# Patient Record
Sex: FEMALE | Race: BLACK OR AFRICAN AMERICAN | NOT HISPANIC OR LATINO | Employment: STUDENT | URBAN - METROPOLITAN AREA
[De-identification: names, ages, dates, MRNs, and addresses within clinical notes are randomized per-mention and may not be internally consistent; named-entity substitution may affect disease eponyms.]

---

## 2023-03-16 LAB
APPEARANCE, URINE: CLEAR
BILIRUBIN, URINE: NEGATIVE
BLOOD, URINE: NEGATIVE
COLOR, URINE: YELLOW
GLUCOSE, URINE: NEGATIVE MG/DL
KETONES, URINE: NEGATIVE MG/DL
LEUKOCYTE ESTERASE, URINE: NEGATIVE
NITRITE, URINE: NEGATIVE
PH, URINE: 5 (ref 5–8)
PROTEIN, URINE: NEGATIVE MG/DL
SPECIFIC GRAVITY, URINE: 1.02 (ref 1–1.03)
UROBILINOGEN, URINE: <2 MG/DL (ref 0–1.9)

## 2023-03-17 LAB
CHLAMYDIA TRACH., AMPLIFIED: NEGATIVE
N. GONORRHEA, AMPLIFIED: NEGATIVE
TRICHOMONAS VAGINALIS: NEGATIVE

## 2023-04-13 LAB
HIV 1/ 2 AG/AB SCREEN: NONREACTIVE
SYPHILIS TOTAL AB: NONREACTIVE

## 2023-04-14 LAB
CHLAMYDIA TRACH., AMPLIFIED: NEGATIVE
N. GONORRHEA, AMPLIFIED: POSITIVE
TRICHOMONAS VAGINALIS: NEGATIVE

## 2023-06-02 DIAGNOSIS — F32.A DEPRESSION, UNSPECIFIED DEPRESSION TYPE: Primary | ICD-10-CM

## 2023-06-02 LAB — HCG, URINE: NEGATIVE

## 2023-06-02 RX ORDER — ESCITALOPRAM OXALATE 20 MG/1
20 TABLET ORAL DAILY
Qty: 30 TABLET | Refills: 3 | Status: SHIPPED | OUTPATIENT
Start: 2023-06-02 | End: 2024-01-25 | Stop reason: WASHOUT

## 2023-06-03 LAB
CHLAMYDIA TRACH., AMPLIFIED: NEGATIVE
CLUE CELLS: PRESENT
N. GONORRHEA, AMPLIFIED: NEGATIVE
NUGENT SCORE: 8
TRICHOMONAS VAGINALIS: NEGATIVE
YEAST: ABNORMAL

## 2023-06-17 DIAGNOSIS — N93.9 ABNORMAL UTERINE BLEEDING: Primary | ICD-10-CM

## 2023-06-17 RX ORDER — ESTRADIOL 2 MG/1
2 TABLET ORAL DAILY
Qty: 7 TABLET | Refills: 0 | Status: SHIPPED | OUTPATIENT
Start: 2023-06-17 | End: 2023-10-03 | Stop reason: ALTCHOICE

## 2023-10-03 ENCOUNTER — OFFICE VISIT (OUTPATIENT)
Dept: PEDIATRICS | Facility: CLINIC | Age: 18
End: 2023-10-03
Payer: COMMERCIAL

## 2023-10-03 VITALS
SYSTOLIC BLOOD PRESSURE: 101 MMHG | TEMPERATURE: 98.4 F | BODY MASS INDEX: 20 KG/M2 | HEART RATE: 85 BPM | HEIGHT: 65 IN | DIASTOLIC BLOOD PRESSURE: 67 MMHG | RESPIRATION RATE: 18 BRPM | WEIGHT: 120.02 LBS

## 2023-10-03 DIAGNOSIS — Z00.00 HEALTHCARE MAINTENANCE: ICD-10-CM

## 2023-10-03 DIAGNOSIS — Z11.3 SCREEN FOR STD (SEXUALLY TRANSMITTED DISEASE): Primary | ICD-10-CM

## 2023-10-03 DIAGNOSIS — Z34.00 PRIMIGRAVIDA, ANTEPARTUM (HHS-HCC): ICD-10-CM

## 2023-10-03 DIAGNOSIS — Z32.01 POSITIVE PREGNANCY TEST (HHS-HCC): ICD-10-CM

## 2023-10-03 LAB — PREGNANCY TEST URINE, POC: POSITIVE

## 2023-10-03 PROCEDURE — 99214 OFFICE O/P EST MOD 30 MIN: CPT | Performed by: PEDIATRICS

## 2023-10-03 PROCEDURE — 81025 URINE PREGNANCY TEST: CPT | Performed by: PEDIATRICS

## 2023-10-03 PROCEDURE — 3008F BODY MASS INDEX DOCD: CPT | Performed by: PEDIATRICS

## 2023-10-03 PROCEDURE — 99214 OFFICE O/P EST MOD 30 MIN: CPT | Mod: GC | Performed by: PEDIATRICS

## 2023-10-03 RX ORDER — TRAZODONE HYDROCHLORIDE 100 MG/1
100 TABLET ORAL NIGHTLY
COMMUNITY
End: 2023-10-05

## 2023-10-03 RX ORDER — VIT C/E/ZN/COPPR/LUTEIN/ZEAXAN 250MG-90MG
25 CAPSULE ORAL DAILY
COMMUNITY
End: 2023-10-05 | Stop reason: ALTCHOICE

## 2023-10-03 ASSESSMENT — ENCOUNTER SYMPTOMS
FEVER: 0
HEMATURIA: 0
HEADACHES: 0
DYSURIA: 0
EYE PAIN: 0
FATIGUE: 1
COUGH: 0
CONSTIPATION: 0
SORE THROAT: 0
DIARRHEA: 0
CHEST TIGHTNESS: 0
BACK PAIN: 0
ABDOMINAL PAIN: 1
FREQUENCY: 0
EYE DISCHARGE: 0
VOMITING: 1
UNEXPECTED WEIGHT CHANGE: 0
DIFFICULTY URINATING: 0
SHORTNESS OF BREATH: 0
NAUSEA: 1
APPETITE CHANGE: 1
RHINORRHEA: 0
ARTHRALGIAS: 0

## 2023-10-03 NOTE — PROGRESS NOTES
Levi is a 17 year old presenting for follow up. Her last depot administration was supposed to be in July but she missed it. She doesn't know when her last menstrual cycle was, but she bled all of June, a little in July, and for 2 days in August. She is concerned that she is pregnant. She started being nauseous and vomiting in August. She took a home pregnancy test a few days ago which was positive after having a negative one a month prior. She was last sexually active 2 weeks ago with the same person since the end of June. She is also having cramps in the suprapubic area.    Depression: no SI/HI.    Review of Systems   Constitutional:  Positive for appetite change (increased) and fatigue. Negative for fever and unexpected weight change.   HENT:  Negative for congestion, rhinorrhea and sore throat.    Eyes:  Negative for pain, discharge and visual disturbance.   Respiratory:  Negative for cough, chest tightness and shortness of breath.    Cardiovascular:  Negative for chest pain.   Gastrointestinal:  Positive for abdominal pain, nausea and vomiting. Negative for constipation and diarrhea.   Genitourinary:  Positive for pelvic pain. Negative for difficulty urinating, dyspareunia, dysuria, frequency, hematuria, vaginal discharge and vaginal pain.   Musculoskeletal:  Negative for arthralgias and back pain.   Skin:  Negative for rash.   Neurological:  Negative for headaches.      She takes an anxiety medication PRN, trazodone 100 mg, lexapro 20 mg, and vitamin D    GENERAL: Well appearing, in no acute distress.  HEENT: No conjunctival injection, no scleral icterus, no conjunctival purulence or exudate. EOMI.  NECK: Supple, full voluntary range of motion  CHEST: Normal, age appropriate external chest  RESPIRATORY: Lungs clear to auscultation bilaterally. No wheezing, no crackles, no coarse breath sounds. Normal work of breathing, age-appropriate respiratory rate.  CARDIOVASCULAR: Regular, age-appropriate rate and  rhythm. No extra heart sounds, no murmurs. Cap refill <2 seconds.  ABDOMEN: Soft, non-distended. Tenderness to palpation in suprapubic area. Bimanual exam consistent with pregnancy about 6 weeks along  GENITOURINARY: Normal external genitalia.  MUSCULOSKELETAL: Normal muscle bulk in all extremities. Normal voluntary range of motion. No joint or limb tenderness.  SKIN: No pathological rashes seen. Well perfused.  NEURO: Sensation and motor capacities grossly intact. Alert and awake with no altered level of consciousness.  PSYCH: General affect within normal limits.     Assessment/Plan:  Levi is a 17 year old presenting for a follow up visit today. UPT was obtained which showed that she is pregnant. She would like to keep the baby and was advised to make an appt with OBGYN. She requested GC/Chlamydia testing but is having no symptoms. Bimanual exam was not concerning for PID in regards to her suprapubic tenderness. She characterizes it as more of pressure. Plan below:    1. Screen for STD (sexually transmitted disease)  - Deferred HIV and syphilis testing until OBGYN appt  - C. trachomatis + N. gonorrhoeae, Amplified; Future    2. Primigravida, antepartum  - Positive pregnancy test on 10/3 in office  - prenatal vitamin calcium-iron-folic tablet 1 tablet   - Advised her to make an appt with OBGYN  - Counseled on discontinuing all drug/tobacco/alcohol use    I saw and evaluated the patient. I personally obtained the key and critical portions of the history and physical exam or was physically present for key and critical portions performed by the resident/fellow. I reviewed the resident/fellow's documentation and discussed the patient with the resident/fellow. I agree with the resident/fellow's medical decision making as documented in the note.    Brit Moses MD

## 2023-10-04 PROBLEM — R79.89 ELEVATED LFTS: Status: ACTIVE | Noted: 2023-10-04

## 2023-10-04 PROBLEM — N93.9 ABNORMAL UTERINE BLEEDING: Status: ACTIVE | Noted: 2023-10-04

## 2023-10-04 PROBLEM — R06.09 DOE (DYSPNEA ON EXERTION): Status: ACTIVE | Noted: 2023-10-04

## 2023-10-04 PROBLEM — T39.1X2S: Status: ACTIVE | Noted: 2023-10-04

## 2023-10-04 PROBLEM — I47.29 NONSUSTAINED VENTRICULAR TACHYCARDIA (MULTI): Status: ACTIVE | Noted: 2023-10-04

## 2023-10-04 PROBLEM — E86.0 DEHYDRATION: Status: ACTIVE | Noted: 2023-10-04

## 2023-10-04 PROBLEM — F32.9 MAJOR DEPRESSIVE DISORDER: Status: ACTIVE | Noted: 2023-10-04

## 2023-10-04 PROBLEM — N89.8 VAGINAL ODOR: Status: ACTIVE | Noted: 2023-10-04

## 2023-10-04 PROBLEM — R82.90 ABNORMAL URINE FINDINGS: Status: ACTIVE | Noted: 2023-10-04

## 2023-10-04 PROBLEM — N94.6 ADOLESCENT DYSMENORRHEA: Status: ACTIVE | Noted: 2023-10-04

## 2023-10-04 PROBLEM — F32.A DEPRESSION: Status: ACTIVE | Noted: 2023-10-04

## 2023-10-04 PROBLEM — A54.9 GONORRHEA: Status: ACTIVE | Noted: 2023-10-04

## 2023-10-04 PROBLEM — T14.91XA SUICIDE ATTEMPT (MULTI): Status: ACTIVE | Noted: 2023-10-04

## 2023-10-04 PROBLEM — N76.0 BACTERIAL VAGINOSIS: Status: ACTIVE | Noted: 2023-10-04

## 2023-10-04 PROBLEM — T39.1X1A TYLENOL INGESTION: Status: ACTIVE | Noted: 2023-10-04

## 2023-10-04 PROBLEM — R35.0 URINE FREQUENCY: Status: ACTIVE | Noted: 2023-10-04

## 2023-10-04 PROBLEM — B96.89 BACTERIAL VAGINOSIS: Status: ACTIVE | Noted: 2023-10-04

## 2023-10-04 PROBLEM — B37.31 VULVOVAGINITIS CANDIDA ALBICANS: Status: ACTIVE | Noted: 2023-10-04

## 2023-10-04 PROBLEM — F41.9 ANXIETY: Status: ACTIVE | Noted: 2023-10-04

## 2023-10-04 RX ORDER — HYDROXYZINE PAMOATE 25 MG/1
1-2 CAPSULE ORAL DAILY PRN
COMMUNITY
Start: 2023-08-29 | End: 2023-10-05

## 2023-10-04 RX ORDER — ASPIRIN 325 MG
1 TABLET ORAL DAILY
COMMUNITY
Start: 2023-04-13 | End: 2023-10-05 | Stop reason: ALTCHOICE

## 2023-10-04 RX ORDER — MEDROXYPROGESTERONE ACETATE 150 MG/ML
INJECTION, SUSPENSION INTRAMUSCULAR
COMMUNITY
Start: 2022-10-11 | End: 2023-10-05

## 2023-10-04 RX ORDER — BUPROPION HYDROCHLORIDE 150 MG/1
1 TABLET ORAL EVERY MORNING
COMMUNITY
Start: 2022-04-28 | End: 2023-10-05 | Stop reason: ALTCHOICE

## 2023-10-04 RX ORDER — NYSTATIN 100000 [USP'U]/G
POWDER TOPICAL
COMMUNITY
Start: 2023-04-17 | End: 2023-10-05 | Stop reason: ALTCHOICE

## 2023-10-04 RX ORDER — DOXYLAMINE SUCCINATE 25 MG
TABLET ORAL 2 TIMES DAILY
COMMUNITY
Start: 2023-04-14 | End: 2023-10-05 | Stop reason: ALTCHOICE

## 2023-10-05 ENCOUNTER — LAB (OUTPATIENT)
Dept: LAB | Facility: LAB | Age: 18
End: 2023-10-05
Payer: COMMERCIAL

## 2023-10-05 ENCOUNTER — INITIAL PRENATAL (OUTPATIENT)
Dept: OBSTETRICS AND GYNECOLOGY | Facility: CLINIC | Age: 18
End: 2023-10-05
Payer: COMMERCIAL

## 2023-10-05 VITALS — SYSTOLIC BLOOD PRESSURE: 104 MMHG | WEIGHT: 120.8 LBS | DIASTOLIC BLOOD PRESSURE: 74 MMHG | BODY MASS INDEX: 20.37 KG/M2

## 2023-10-05 DIAGNOSIS — F33.9 EPISODE OF RECURRENT MAJOR DEPRESSIVE DISORDER, UNSPECIFIED DEPRESSION EPISODE SEVERITY (CMS-HCC): ICD-10-CM

## 2023-10-05 DIAGNOSIS — Z34.01 SUPERVISION OF NORMAL FIRST TEEN PREGNANCY IN FIRST TRIMESTER (HHS-HCC): ICD-10-CM

## 2023-10-05 DIAGNOSIS — O36.80X0 PREGNANCY, LOCATION UNKNOWN (HHS-HCC): ICD-10-CM

## 2023-10-05 DIAGNOSIS — Z3A.08 8 WEEKS GESTATION OF PREGNANCY (HHS-HCC): Primary | ICD-10-CM

## 2023-10-05 DIAGNOSIS — I47.29 NONSUSTAINED VENTRICULAR TACHYCARDIA (MULTI): ICD-10-CM

## 2023-10-05 DIAGNOSIS — Z3A.08 8 WEEKS GESTATION OF PREGNANCY (HHS-HCC): ICD-10-CM

## 2023-10-05 PROBLEM — N76.0 BACTERIAL VAGINOSIS: Status: RESOLVED | Noted: 2023-10-04 | Resolved: 2023-10-05

## 2023-10-05 PROBLEM — N89.8 VAGINAL ODOR: Status: RESOLVED | Noted: 2023-10-04 | Resolved: 2023-10-05

## 2023-10-05 PROBLEM — N94.6 ADOLESCENT DYSMENORRHEA: Status: RESOLVED | Noted: 2023-10-04 | Resolved: 2023-10-05

## 2023-10-05 PROBLEM — E86.0 DEHYDRATION: Status: RESOLVED | Noted: 2023-10-04 | Resolved: 2023-10-05

## 2023-10-05 PROBLEM — B37.31 VULVOVAGINITIS CANDIDA ALBICANS: Status: RESOLVED | Noted: 2023-10-04 | Resolved: 2023-10-05

## 2023-10-05 PROBLEM — R82.90 ABNORMAL URINE FINDINGS: Status: RESOLVED | Noted: 2023-10-04 | Resolved: 2023-10-05

## 2023-10-05 PROBLEM — B96.89 BACTERIAL VAGINOSIS: Status: RESOLVED | Noted: 2023-10-04 | Resolved: 2023-10-05

## 2023-10-05 LAB
ABO GROUP (TYPE) IN BLOOD: NORMAL
ERYTHROCYTE [DISTWIDTH] IN BLOOD BY AUTOMATED COUNT: 14.4 % (ref 11.5–14.5)
HCT VFR BLD AUTO: 41.5 % (ref 36–46)
HGB BLD-MCNC: 13.5 G/DL (ref 12–16)
HIV 1+2 AB+HIV1 P24 AG SERPL QL IA: NONREACTIVE
MCH RBC QN AUTO: 30.4 PG (ref 26–34)
MCHC RBC AUTO-ENTMCNC: 32.5 G/DL (ref 32–36)
MCV RBC AUTO: 94 FL (ref 80–100)
NRBC BLD-RTO: 0 /100 WBCS (ref 0–0)
PLATELET # BLD AUTO: 296 X10*3/UL (ref 150–450)
PMV BLD AUTO: 10.7 FL (ref 7.5–11.5)
PREGNANCY TEST URINE, POC: POSITIVE
RBC # BLD AUTO: 4.44 X10*6/UL (ref 4–5.2)
REFLEX ADDED, ANEMIA PANEL: NORMAL
RH FACTOR (ANTIGEN D): NORMAL
WBC # BLD AUTO: 5.7 X10*3/UL (ref 4.4–11.3)

## 2023-10-05 PROCEDURE — 81025 URINE PREGNANCY TEST: CPT

## 2023-10-05 PROCEDURE — 87340 HEPATITIS B SURFACE AG IA: CPT

## 2023-10-05 PROCEDURE — 81243 FMR1 GEN ALY DETC ABNL ALLEL: CPT

## 2023-10-05 PROCEDURE — 99214 OFFICE O/P EST MOD 30 MIN: CPT | Mod: GC | Performed by: OBSTETRICS & GYNECOLOGY

## 2023-10-05 PROCEDURE — 99204 OFFICE O/P NEW MOD 45 MIN: CPT | Performed by: OBSTETRICS & GYNECOLOGY

## 2023-10-05 PROCEDURE — 87086 URINE CULTURE/COLONY COUNT: CPT

## 2023-10-05 PROCEDURE — 86900 BLOOD TYPING SEROLOGIC ABO: CPT

## 2023-10-05 PROCEDURE — 83021 HEMOGLOBIN CHROMOTOGRAPHY: CPT

## 2023-10-05 PROCEDURE — 81329 SMN1 GENE DOS/DELETION ALYS: CPT

## 2023-10-05 PROCEDURE — 87389 HIV-1 AG W/HIV-1&-2 AB AG IA: CPT

## 2023-10-05 PROCEDURE — 85027 COMPLETE CBC AUTOMATED: CPT

## 2023-10-05 PROCEDURE — 83020 HEMOGLOBIN ELECTROPHORESIS: CPT | Performed by: OBSTETRICS & GYNECOLOGY

## 2023-10-05 PROCEDURE — 86901 BLOOD TYPING SEROLOGIC RH(D): CPT

## 2023-10-05 PROCEDURE — 36415 COLL VENOUS BLD VENIPUNCTURE: CPT

## 2023-10-05 PROCEDURE — 86780 TREPONEMA PALLIDUM: CPT

## 2023-10-05 PROCEDURE — 84702 CHORIONIC GONADOTROPIN TEST: CPT

## 2023-10-05 PROCEDURE — 81220 CFTR GENE COM VARIANTS: CPT

## 2023-10-05 PROCEDURE — 86803 HEPATITIS C AB TEST: CPT

## 2023-10-05 PROCEDURE — 86317 IMMUNOASSAY INFECTIOUS AGENT: CPT

## 2023-10-05 PROCEDURE — 87800 DETECT AGNT MULT DNA DIREC: CPT

## 2023-10-05 ASSESSMENT — EDINBURGH POSTNATAL DEPRESSION SCALE (EPDS)
I HAVE BEEN SO UNHAPPY THAT I HAVE HAD DIFFICULTY SLEEPING: NOT VERY OFTEN
I HAVE BLAMED MYSELF UNNECESSARILY WHEN THINGS WENT WRONG: YES, SOME OF THE TIME
I HAVE BEEN ABLE TO LAUGH AND SEE THE FUNNY SIDE OF THINGS: AS MUCH AS I ALWAYS COULD
I HAVE FELT SAD OR MISERABLE: YES, MOST OF THE TIME
I HAVE LOOKED FORWARD WITH ENJOYMENT TO THINGS: AS MUCH AS I EVER DID
I HAVE BEEN ANXIOUS OR WORRIED FOR NO GOOD REASON: YES, SOMETIMES
THINGS HAVE BEEN GETTING ON TOP OF ME: YES, SOMETIMES I HAVEN'T BEEN COPING AS WELL AS USUAL
I HAVE FELT SCARED OR PANICKY FOR NO GOOD REASON: NO, NOT AT ALL
I HAVE BEEN SO UNHAPPY THAT I HAVE BEEN CRYING: YES, MOST OF THE TIME
TOTAL SCORE: 13
THE THOUGHT OF HARMING MYSELF HAS OCCURRED TO ME: NEVER

## 2023-10-05 NOTE — ASSESSMENT & PLAN NOTE
- PNLs ordered  - for dating US / verification of IUP  - interested in carrier screening, ordered  - interested in genetic screening, for First Check   - continue PNV   - discussed Centering Pregnancy, pt uncertain but will consider

## 2023-10-05 NOTE — PROGRESS NOTES
Subjective   Patient ID [id number]   Levi Newberry is a 17 y.o.  at 5w1d with a working estimated date of delivery of 2024, by Last Menstrual Period who presents for an initial prenatal visit. This pregnancy is unplanned, pt uncertain but thinks she wants to keep the pregnancy. Uncertain of LMP b/c transitioning off depo. Support from aunt/uncle, mom. FOB supportive.     Pregnancy c/b  - h/o arrhythmia - non-sustained VT identified during hospitalization for tylenol overdose, s/p neg Holter monitor workup, neg echo, neg cardiac MRI; cardiac stress test pending   - syncopal episodes since , 3 episodes total - workup as above    - h/o depression/anxiety - taking lexapro 20mg, follows with psych and counseling. H/o suicide attempts via  overdose, last in 2022. Reports mood is stable/improved, denies SI/HI currently    GynHx: remote h/o gonorrhea, chlamydia s/p treatment   PMH: as above  PSH: none  Meds: lexapro, PNV  All: NKDA  Soc: denies t/e/d  Imm: COVID x1    OB History    Para Term  AB Living   1             SAB IAB Ectopic Multiple Live Births                  # Outcome Date GA Lbr Jim/2nd Weight Sex Delivery Anes PTL Lv   1 Current                   Objective     General: no acute distress  HEENT: normocephalic, atraumatic. Thyroid nonenlarged  Heart: warm and well perfused  Lungs: clear to auscultation bilaterally  Abdomen: soft, nontender throughout  : normal vagina, cervix, uterus measuring ~6wks  Extremities: moving all extremities  Neuro: awake and conversant  Psych: appropriate mood and affect     Expected Total Weight Gain: Could not be calculated   Pregravid BMI: Could not be calculated       Assessment/Plan   Problem List Items Addressed This Visit             ICD-10-CM    Major depressive disorder F32.9     - mood currently stable, denies SI/HI  - continue lexapro 20mg          Nonsustained ventricular tachycardia (CMS/HCC) I47.29     - Recommend re-establish  with cardiology to evaluate if further workup needed         Pregnancy, location unknown O36.80X0     - suspect likely early IUP  - hcg quant ordered   - no US availability today, schedule for next available  - ectopic precautions given          8 weeks gestation of pregnancy - Primary Z3A.08     - PNLs ordered  - for dating US / verification of IUP  - interested in carrier screening, ordered  - interested in genetic screening, for First Check   - continue PNV   - discussed Centering Pregnancy, pt uncertain but will consider          Relevant Orders    POC pregnancy, urine (Completed)    US MAC OB imaging order    ABO/Rh    CBC Anemia Panel With Reflex,Pregnancy    HEMOGLOBIN IDENTIFICATION WITH PATH REVIEW    Hepatitis B surface antigen    Hepatitis C antibody    Rubella Antibody, IgG    Syphilis Screen with Reflex    HIV 1/2 Antigen/Antibody Screen with Reflex to Confirmation    Urine Culture    CF carrier screen    SMA Carrier Screening    Fragile X Analysis    hCG, quantitative    Comprehensive metabolic panel     Other Visit Diagnoses         Codes    Supervision of normal first teen pregnancy in first trimester     Z34.01            Follow up in 4 weeks for return OB visit.    Seen and d/w Dr. Zia Sloan MD  PGY2, Obstetrics and Gynecology

## 2023-10-05 NOTE — PROGRESS NOTES
I saw and evaluated the patient. I personally obtained the key and critical portions of the history and physical exam or was physically present for key and critical portions performed by the resident/fellow. I reviewed the resident/fellow's documentation and discussed the patient with the resident/fellow. I agree with the resident/fellow's medical decision making as documented in the note.    Marlin Lizarraga MD

## 2023-10-05 NOTE — ASSESSMENT & PLAN NOTE
- suspect likely early IUP  - hcg quant ordered   - no US availability today, schedule for next available  - ectopic precautions given

## 2023-10-06 LAB
B-HCG SERPL-ACNC: 1727 MIU/ML
BACTERIA UR CULT: NORMAL
C TRACH RRNA SPEC QL NAA+PROBE: NEGATIVE
HBV SURFACE AG SERPL QL IA: NONREACTIVE
HCV AB SER QL: NONREACTIVE
HEMOGLOBIN A2: 2.9 % (ref 2–3.5)
HEMOGLOBIN A: 96.8 % (ref 95.8–98)
HEMOGLOBIN C: 0 %
HEMOGLOBIN F: 0.3 % (ref 0–2)
HEMOGLOBIN IDENTIFICATION INTERPRETATION: NORMAL
HEMOGLOBIN OTHER: 0 %
HEMOGLOBIN S: 0 %
N GONORRHOEA DNA SPEC QL PROBE+SIG AMP: NEGATIVE
PATH REVIEW-HGB IDENTIFICATION: NORMAL
RUBV IGG SERPL IA-ACNC: 1.6 IA
RUBV IGG SERPL QL IA: POSITIVE
T PALLIDUM AB SER QL: NONREACTIVE

## 2023-10-06 NOTE — RESULT ENCOUNTER NOTE
She has an ultrasound today 10/6 at 1130, I'll add her to the beta book so we can follow up a quant as well depending what they see on TVUS

## 2023-10-10 ENCOUNTER — ANCILLARY PROCEDURE (OUTPATIENT)
Dept: OBSTETRICS AND GYNECOLOGY | Facility: CLINIC | Age: 18
End: 2023-10-10
Payer: COMMERCIAL

## 2023-10-10 ENCOUNTER — ANCILLARY PROCEDURE (OUTPATIENT)
Dept: RADIOLOGY | Facility: CLINIC | Age: 18
End: 2023-10-10
Payer: COMMERCIAL

## 2023-10-10 DIAGNOSIS — O36.80X1: ICD-10-CM

## 2023-10-10 DIAGNOSIS — Z03.74 ENCOUNTER FOR SUSPECTED PROBLEM WITH FETAL GROWTH RULED OUT: ICD-10-CM

## 2023-10-10 DIAGNOSIS — Z36.86 ENCOUNTER FOR ANTENATAL SCREENING FOR CERVICAL LENGTH (HHS-HCC): ICD-10-CM

## 2023-10-10 LAB
ELECTRONICALLY SIGNED BY: NORMAL
FRAGILE X INTERPRETATION: NORMAL
FRAGILE X RESULT: NORMAL

## 2023-10-10 PROCEDURE — 76817 TRANSVAGINAL US OBSTETRIC: CPT | Performed by: STUDENT IN AN ORGANIZED HEALTH CARE EDUCATION/TRAINING PROGRAM

## 2023-10-10 PROCEDURE — 76801 OB US < 14 WKS SINGLE FETUS: CPT

## 2023-10-10 PROCEDURE — 76817 TRANSVAGINAL US OBSTETRIC: CPT

## 2023-10-10 PROCEDURE — 76801 OB US < 14 WKS SINGLE FETUS: CPT | Performed by: STUDENT IN AN ORGANIZED HEALTH CARE EDUCATION/TRAINING PROGRAM

## 2023-10-11 ENCOUNTER — APPOINTMENT (OUTPATIENT)
Dept: OBSTETRICS AND GYNECOLOGY | Facility: HOSPITAL | Age: 18
End: 2023-10-11
Payer: COMMERCIAL

## 2023-10-11 PROBLEM — O36.80X0 PREGNANCY, LOCATION UNKNOWN (HHS-HCC): Status: RESOLVED | Noted: 2023-10-05 | Resolved: 2023-10-11

## 2023-10-13 LAB
CFTR MUT ANL BLD/T: NORMAL
ELECTRONICALLY SIGNED BY: NORMAL
ELECTRONICALLY SIGNED BY: NORMAL
SMA RESULT: NORMAL

## 2023-10-20 ENCOUNTER — ANCILLARY PROCEDURE (OUTPATIENT)
Dept: RADIOLOGY | Facility: CLINIC | Age: 18
End: 2023-10-20
Payer: COMMERCIAL

## 2023-10-20 DIAGNOSIS — Z36.87 ENCOUNTER FOR ANTENATAL SCREENING FOR UNCERTAIN DATES (HHS-HCC): ICD-10-CM

## 2023-10-20 DIAGNOSIS — Z3A.08 8 WEEKS GESTATION OF PREGNANCY (HHS-HCC): Primary | ICD-10-CM

## 2023-10-20 PROCEDURE — 76816 OB US FOLLOW-UP PER FETUS: CPT | Performed by: OBSTETRICS & GYNECOLOGY

## 2023-10-20 PROCEDURE — 76817 TRANSVAGINAL US OBSTETRIC: CPT | Performed by: OBSTETRICS & GYNECOLOGY

## 2023-10-20 PROCEDURE — 76817 TRANSVAGINAL US OBSTETRIC: CPT

## 2023-10-20 PROCEDURE — 76816 OB US FOLLOW-UP PER FETUS: CPT

## 2023-10-22 ENCOUNTER — APPOINTMENT (OUTPATIENT)
Dept: RADIOLOGY | Facility: HOSPITAL | Age: 18
End: 2023-10-22
Payer: COMMERCIAL

## 2023-10-22 ENCOUNTER — HOSPITAL ENCOUNTER (EMERGENCY)
Facility: HOSPITAL | Age: 18
Discharge: HOME | End: 2023-10-22
Attending: EMERGENCY MEDICINE
Payer: COMMERCIAL

## 2023-10-22 VITALS
DIASTOLIC BLOOD PRESSURE: 77 MMHG | HEART RATE: 77 BPM | SYSTOLIC BLOOD PRESSURE: 103 MMHG | WEIGHT: 118 LBS | OXYGEN SATURATION: 99 % | BODY MASS INDEX: 20.14 KG/M2 | HEIGHT: 64 IN | TEMPERATURE: 98.6 F | RESPIRATION RATE: 16 BRPM

## 2023-10-22 LAB
ALBUMIN SERPL BCP-MCNC: 4.3 G/DL (ref 3.4–5)
ALP SERPL-CCNC: 40 U/L (ref 33–110)
ALT SERPL W P-5'-P-CCNC: 7 U/L (ref 7–45)
AMPHETAMINES UR QL SCN: ABNORMAL
ANION GAP SERPL CALC-SCNC: 14 MMOL/L (ref 10–20)
APAP SERPL-MCNC: <10 UG/ML
APPEARANCE UR: CLEAR
AST SERPL W P-5'-P-CCNC: 14 U/L (ref 9–39)
BARBITURATES UR QL SCN: ABNORMAL
BASOPHILS # BLD AUTO: 0.02 X10*3/UL (ref 0–0.1)
BASOPHILS NFR BLD AUTO: 0.2 %
BENZODIAZ UR QL SCN: ABNORMAL
BILIRUB SERPL-MCNC: 0.8 MG/DL (ref 0–1.2)
BILIRUB UR STRIP.AUTO-MCNC: NEGATIVE MG/DL
BUN SERPL-MCNC: 8 MG/DL (ref 6–23)
BZE UR QL SCN: ABNORMAL
C TRACH RRNA SPEC QL NAA+PROBE: NEGATIVE
CALCIUM SERPL-MCNC: 9.4 MG/DL (ref 8.6–10.6)
CANNABINOIDS UR QL SCN: ABNORMAL
CARDIAC TROPONIN I PNL SERPL HS: <3 NG/L (ref 0–34)
CHLORIDE SERPL-SCNC: 103 MMOL/L (ref 98–107)
CLUE CELLS SPEC QL WET PREP: NORMAL
CO2 SERPL-SCNC: 23 MMOL/L (ref 21–32)
COLOR UR: YELLOW
CREAT SERPL-MCNC: 0.74 MG/DL (ref 0.5–1.05)
EOSINOPHIL # BLD AUTO: 0.03 X10*3/UL (ref 0–0.7)
EOSINOPHIL NFR BLD AUTO: 0.3 %
ERYTHROCYTE [DISTWIDTH] IN BLOOD BY AUTOMATED COUNT: 13.4 % (ref 11.5–14.5)
ETHANOL SERPL-MCNC: <10 MG/DL
FENTANYL+NORFENTANYL UR QL SCN: ABNORMAL
GFR SERPL CREATININE-BSD FRML MDRD: >90 ML/MIN/1.73M*2
GLUCOSE SERPL-MCNC: 100 MG/DL (ref 74–99)
GLUCOSE UR STRIP.AUTO-MCNC: NEGATIVE MG/DL
HCT VFR BLD AUTO: 35.4 % (ref 36–46)
HGB BLD-MCNC: 12.2 G/DL (ref 12–16)
HIV 1+2 AB+HIV1 P24 AG SERPL QL IA: NONREACTIVE
HOLD SPECIMEN: NORMAL
IMM GRANULOCYTES # BLD AUTO: 0.03 X10*3/UL (ref 0–0.7)
IMM GRANULOCYTES NFR BLD AUTO: 0.3 % (ref 0–0.9)
KETONES UR STRIP.AUTO-MCNC: ABNORMAL MG/DL
LEUKOCYTE ESTERASE UR QL STRIP.AUTO: ABNORMAL
LYMPHOCYTES # BLD AUTO: 2.07 X10*3/UL (ref 1.2–4.8)
LYMPHOCYTES NFR BLD AUTO: 22.9 %
MCH RBC QN AUTO: 30.4 PG (ref 26–34)
MCHC RBC AUTO-ENTMCNC: 34.5 G/DL (ref 32–36)
MCV RBC AUTO: 88 FL (ref 80–100)
MONOCYTES # BLD AUTO: 0.63 X10*3/UL (ref 0.1–1)
MONOCYTES NFR BLD AUTO: 7 %
MUCOUS THREADS #/AREA URNS AUTO: NORMAL /LPF
N GONORRHOEA DNA SPEC QL PROBE+SIG AMP: NEGATIVE
NEUTROPHILS # BLD AUTO: 6.27 X10*3/UL (ref 1.2–7.7)
NEUTROPHILS NFR BLD AUTO: 69.3 %
NITRITE UR QL STRIP.AUTO: NEGATIVE
NRBC BLD-RTO: 0 /100 WBCS (ref 0–0)
OPIATES UR QL SCN: ABNORMAL
OXYCODONE+OXYMORPHONE UR QL SCN: ABNORMAL
PCP UR QL SCN: ABNORMAL
PH UR STRIP.AUTO: 5 [PH]
PLATELET # BLD AUTO: 258 X10*3/UL (ref 150–450)
PMV BLD AUTO: 9.9 FL (ref 7.5–11.5)
POTASSIUM SERPL-SCNC: 3.5 MMOL/L (ref 3.5–5.3)
PROT SERPL-MCNC: 7 G/DL (ref 6.4–8.2)
PROT UR STRIP.AUTO-MCNC: ABNORMAL MG/DL
RBC # BLD AUTO: 4.01 X10*6/UL (ref 4–5.2)
RBC # UR STRIP.AUTO: NEGATIVE /UL
RBC #/AREA URNS AUTO: NORMAL /HPF
SALICYLATES SERPL-MCNC: <3 MG/DL
SARS-COV-2 RNA RESP QL NAA+PROBE: NOT DETECTED
SODIUM SERPL-SCNC: 136 MMOL/L (ref 136–145)
SP GR UR STRIP.AUTO: 1.03
SQUAMOUS #/AREA URNS AUTO: NORMAL /HPF
T VAGINALIS SPEC QL WET PREP: NORMAL
UROBILINOGEN UR STRIP.AUTO-MCNC: <2 MG/DL
WBC # BLD AUTO: 9.1 X10*3/UL (ref 4.4–11.3)
WBC #/AREA URNS AUTO: NORMAL /HPF
WBC VAG QL WET PREP: NORMAL
YEAST VAG QL WET PREP: NORMAL

## 2023-10-22 PROCEDURE — 2500000001 HC RX 250 WO HCPCS SELF ADMINISTERED DRUGS (ALT 637 FOR MEDICARE OP): Mod: SE | Performed by: STUDENT IN AN ORGANIZED HEALTH CARE EDUCATION/TRAINING PROGRAM

## 2023-10-22 PROCEDURE — 99283 EMERGENCY DEPT VISIT LOW MDM: CPT

## 2023-10-22 PROCEDURE — 80053 COMPREHEN METABOLIC PANEL: CPT | Performed by: EMERGENCY MEDICINE

## 2023-10-22 PROCEDURE — S0119 ONDANSETRON 4 MG: HCPCS | Mod: SE | Performed by: STUDENT IN AN ORGANIZED HEALTH CARE EDUCATION/TRAINING PROGRAM

## 2023-10-22 PROCEDURE — 80307 DRUG TEST PRSMV CHEM ANLYZR: CPT | Mod: CMCLAB | Performed by: EMERGENCY MEDICINE

## 2023-10-22 PROCEDURE — 80329 ANALGESICS NON-OPIOID 1 OR 2: CPT | Mod: 91 | Performed by: EMERGENCY MEDICINE

## 2023-10-22 PROCEDURE — 87086 URINE CULTURE/COLONY COUNT: CPT | Mod: CMCLAB | Performed by: STUDENT IN AN ORGANIZED HEALTH CARE EDUCATION/TRAINING PROGRAM

## 2023-10-22 PROCEDURE — 87800 DETECT AGNT MULT DNA DIREC: CPT | Mod: CMCLAB | Performed by: STUDENT IN AN ORGANIZED HEALTH CARE EDUCATION/TRAINING PROGRAM

## 2023-10-22 PROCEDURE — 93005 ELECTROCARDIOGRAM TRACING: CPT

## 2023-10-22 PROCEDURE — 81001 URINALYSIS AUTO W/SCOPE: CPT | Mod: CMCLAB | Performed by: STUDENT IN AN ORGANIZED HEALTH CARE EDUCATION/TRAINING PROGRAM

## 2023-10-22 PROCEDURE — 2500000004 HC RX 250 GENERAL PHARMACY W/ HCPCS (ALT 636 FOR OP/ED): Mod: SE | Performed by: STUDENT IN AN ORGANIZED HEALTH CARE EDUCATION/TRAINING PROGRAM

## 2023-10-22 PROCEDURE — 2500000005 HC RX 250 GENERAL PHARMACY W/O HCPCS: Mod: SE | Performed by: STUDENT IN AN ORGANIZED HEALTH CARE EDUCATION/TRAINING PROGRAM

## 2023-10-22 PROCEDURE — 99285 EMERGENCY DEPT VISIT HI MDM: CPT | Performed by: EMERGENCY MEDICINE

## 2023-10-22 PROCEDURE — 80320 DRUG SCREEN QUANTALCOHOLS: CPT | Mod: CMCLAB | Performed by: EMERGENCY MEDICINE

## 2023-10-22 PROCEDURE — 87389 HIV-1 AG W/HIV-1&-2 AB AG IA: CPT | Performed by: STUDENT IN AN ORGANIZED HEALTH CARE EDUCATION/TRAINING PROGRAM

## 2023-10-22 PROCEDURE — 84484 ASSAY OF TROPONIN QUANT: CPT | Mod: CMCLAB | Performed by: STUDENT IN AN ORGANIZED HEALTH CARE EDUCATION/TRAINING PROGRAM

## 2023-10-22 PROCEDURE — 87210 SMEAR WET MOUNT SALINE/INK: CPT | Mod: CMCLAB | Performed by: STUDENT IN AN ORGANIZED HEALTH CARE EDUCATION/TRAINING PROGRAM

## 2023-10-22 PROCEDURE — 71046 X-RAY EXAM CHEST 2 VIEWS: CPT | Performed by: STUDENT IN AN ORGANIZED HEALTH CARE EDUCATION/TRAINING PROGRAM

## 2023-10-22 PROCEDURE — 36415 COLL VENOUS BLD VENIPUNCTURE: CPT | Performed by: STUDENT IN AN ORGANIZED HEALTH CARE EDUCATION/TRAINING PROGRAM

## 2023-10-22 PROCEDURE — 87635 SARS-COV-2 COVID-19 AMP PRB: CPT | Mod: CMCLAB | Performed by: EMERGENCY MEDICINE

## 2023-10-22 PROCEDURE — 85025 COMPLETE CBC W/AUTO DIFF WBC: CPT | Performed by: EMERGENCY MEDICINE

## 2023-10-22 PROCEDURE — 99281 EMR DPT VST MAYX REQ PHY/QHP: CPT | Mod: 25

## 2023-10-22 PROCEDURE — 71046 X-RAY EXAM CHEST 2 VIEWS: CPT

## 2023-10-22 RX ORDER — DICYCLOMINE HYDROCHLORIDE 10 MG/1
10 CAPSULE ORAL ONCE
Status: COMPLETED | OUTPATIENT
Start: 2023-10-22 | End: 2023-10-22

## 2023-10-22 RX ORDER — ONDANSETRON 4 MG/1
4 TABLET, ORALLY DISINTEGRATING ORAL ONCE
Status: COMPLETED | OUTPATIENT
Start: 2023-10-22 | End: 2023-10-22

## 2023-10-22 RX ORDER — LIDOCAINE 560 MG/1
1 PATCH PERCUTANEOUS; TOPICAL; TRANSDERMAL DAILY
Status: DISCONTINUED | OUTPATIENT
Start: 2023-10-22 | End: 2023-10-22 | Stop reason: HOSPADM

## 2023-10-22 RX ORDER — DICYCLOMINE HYDROCHLORIDE 10 MG/ML
20 INJECTION INTRAMUSCULAR ONCE
Status: DISCONTINUED | OUTPATIENT
Start: 2023-10-22 | End: 2023-10-22

## 2023-10-22 RX ADMIN — LIDOCAINE 1 PATCH: 4 PATCH TOPICAL at 05:15

## 2023-10-22 RX ADMIN — ONDANSETRON 4 MG: 4 TABLET, ORALLY DISINTEGRATING ORAL at 05:15

## 2023-10-22 RX ADMIN — DICYCLOMINE HYDROCHLORIDE 10 MG: 10 CAPSULE ORAL at 08:14

## 2023-10-22 ASSESSMENT — PAIN DESCRIPTION - LOCATION
LOCATION_2: CHEST
LOCATION: ABDOMEN

## 2023-10-22 ASSESSMENT — LIFESTYLE VARIABLES
EVER HAD A DRINK FIRST THING IN THE MORNING TO STEADY YOUR NERVES TO GET RID OF A HANGOVER: NO
REASON UNABLE TO ASSESS: NO
EVER FELT BAD OR GUILTY ABOUT YOUR DRINKING: NO
HAVE PEOPLE ANNOYED YOU BY CRITICIZING YOUR DRINKING: NO
HAVE YOU EVER FELT YOU SHOULD CUT DOWN ON YOUR DRINKING: NO

## 2023-10-22 ASSESSMENT — COLUMBIA-SUICIDE SEVERITY RATING SCALE - C-SSRS
1. IN THE PAST MONTH, HAVE YOU WISHED YOU WERE DEAD OR WISHED YOU COULD GO TO SLEEP AND NOT WAKE UP?: NO
6. HAVE YOU EVER DONE ANYTHING, STARTED TO DO ANYTHING, OR PREPARED TO DO ANYTHING TO END YOUR LIFE?: NO
2. HAVE YOU ACTUALLY HAD ANY THOUGHTS OF KILLING YOURSELF?: NO

## 2023-10-22 ASSESSMENT — PAIN DESCRIPTION - FREQUENCY: FREQUENCY: CONSTANT/CONTINUOUS

## 2023-10-22 ASSESSMENT — PAIN SCALES - GENERAL
PAINLEVEL_OUTOF10: 7
PAINLEVEL_OUTOF10: 7

## 2023-10-22 ASSESSMENT — PAIN DESCRIPTION - DESCRIPTORS
DESCRIPTORS: TIGHTNESS
DESCRIPTORS_2: TIGHTNESS

## 2023-10-22 ASSESSMENT — PAIN - FUNCTIONAL ASSESSMENT: PAIN_FUNCTIONAL_ASSESSMENT: 0-10

## 2023-10-22 NOTE — ED TRIAGE NOTES
"Patient states \" I'm 7 weeks pregnant and I had a mental breakdown earlier because I am super stressed out which caused my stomach and chest to get really tight. I've kind of been all over the place and I just want to get checked out and be monitored.\" Patients due date is .   "

## 2023-10-22 NOTE — PROGRESS NOTES
Provider was informed that patient had left without treatment being complete.  Her lab work and chest x-ray had returned unremarkable.  She was awaiting EPAT evaluation due to the difficulty she was having with her pregnancy.  According to the charge nurse, no elopement precautions were ordered when the patient was evaluated overnight.  Patient and her parent were recontacted who stated they left because they were getting tired from long wait.  She denies any suicidal or homicidal ideation at this time he does not want to return to the emergency department.  I do not believe she is a danger to herself or others at the present time.  She is at a safe location with her family.

## 2023-10-22 NOTE — ED PROVIDER NOTES
History of Present Illness   CC: Abdominal Pain     History provided by: Patient  Limitations to History: None    HPI:  Levi Newberry is a 17 y.o. female who is G1, P0 at approximately 7 weeks gestational age with past medical history of depression/anxiety, formally on Lexapro, but taken off at the start of her pregnancy.  She presents to the emergency department today with abdominal pain and chest pain that started yesterday.  She reports that she has been struggling with stress and anxiety since being off her Lexapro. She reports that she feels like she had a panic attack yesterday. Describes the pain in her chest as tightness across the lower portion just underneath her breasts.  Describes the pain in her abdomen as a tightness across the lower abdomen.  No trauma, falls, or injuries. No passing out. Denies any vaginal bleeding.  Endorses milky vaginal discharge.  Denies any headache, dizziness, vision changes, neck pain, nausea, vomiting, shortness of breath, fevers, chills, cough.  Denies any urinary frequency or urgency.  Has been seen at [facility] by one of the midwives already in this pregnancy and is prescribed a prenatal vitamin although she has not yet been taking it. No leg pain or leg swelling. No sick contacts.    External Records Reviewed: Reviewed records from [facility] visit.  Patient has also had transvaginal ultrasound that confirms an IUP.    Physical Exam   Triage vitals:  T 37 °C (98.6 °F)  HR 84  /59  RR 16  O2 98 % None (Room air)    Vital signs reviewed in nursing triage note, EMR flow sheets, and at patient's bedside.   GENERAL: Awake, alert, in no acute distress  EYES: Gaze conjugate.  No scleral icterus or injection  HENT: Normo-cephalic, atraumatic. No stridor. No rhinorrhea or epistaxis.  CV: Regular rate, regular rhythm. No murmurs appreciated. Radial pulses 2+ bilaterally  RESP: Breathing non-labored, speaking in full sentences.  Clear to auscultation  bilaterally  GI: Soft, non-distended, mild tenderness in the bilateral lower quadrants. No rebound or guarding.  : Normal external female genitalia.  No rashes, lesions vesicles, drainage.  Cervical os was closed.  Moderate amount of milky white vaginal discharge.  Normal vaginal mucosa. On bimanual exam, there is no CMT, adnexal fullness, or adnexal tenderness.  MSK/Extremities: No gross bony deformities. Moving all extremities  SKIN: Warm. Appropriate color  NEURO: Alert. Oriented. Face symmetric. Speech is fluent.  Gross strength and sensation intact in b/l UE and LEs  PSYCH: Appropriate mood and affect    ED Course & Medical Decision Making   ED Course:  ED Course as of 10/22/23 1701   Sun Oct 22, 2023   0358 ECG 12 lead  EKG obtained at 0306 demonstrating normal sinus rhythm with a rate of 83, normal axis, normal intervals, no ST segment or T wave signs of ischemia. [SH]   0650 Comprehensive metabolic panel(!)  Metabolic panel within normal limits [SH]   0650 CBC and Auto Differential(!)  CBC within normal limits [SH]   0650 Troponin I, High Sensitivity  Troponin negative [SH]   0703 Sars-CoV-2 PCR, Symptomatic [SH]   0704 Acute Toxicology Panel, Blood [SH]   0715 HIV 1/2 Antigen/Antibody Screen with Reflex to Confirmation  HIV negative [SH]      ED Course User Index  [SH] Evaristo Licona MD       Differential diagnoses considered include but are not limited to: Miscarriage, ectopic pregnancy, ACS, UTI, STI    Social Determinants Limiting Care: None identified    MDM:  Levi Newberry is a 17 y.o. female presenting to the emergency department with abdominal pain, chest pain in the setting of early pregnancy.  On arrival vital signs within normal limits.  Patient relatively well-appearing.  Her abdominal exam and pelvic exam are benign.  She has a prior transvaginal ultrasound that confirmed an IUP.  We do not need to repeat this today although bedside ultrasound demonstrates no visible IUP,  anticipate this is just due to patient's early pregnancy. Patient is Rh-, but given she has no vaginal bleeding and no concern for ectopic pregnancy today, she does not require RhoGAM.  Basic work-up for chest pain initiated with chest x-ray and troponin pending at this time.  In further discussions with the patient, she states she has been stressed and anxious, feels that her symptoms are related to a possible panic attack yesterday.  She denies any current suicidal ideation, homicidal ideation, auditory or visual hallucinations.  Offered her evaluation by EPAT which she is amenable to.  We will move her from supertrack area to treatment room where she will pend EPAT evaluation after completion of her work-up.  Patient signed out at 7 AM.  Results from her work-up up until the point of signout or commented on above in the ED course.  She remained stable under my care.    Procedures   Procedures    Patient seen and discussed with ED attending physician.    Simon Licona MD  PGY 3 Emergency Medicine         Evaristo Licona MD  Resident  10/22/23 7031       Elizabeth Manriquez MD  10/22/23 2750

## 2023-10-24 ENCOUNTER — HOSPITAL ENCOUNTER (OUTPATIENT)
Dept: CARDIOLOGY | Facility: HOSPITAL | Age: 18
Discharge: HOME | End: 2023-10-24
Payer: COMMERCIAL

## 2023-10-24 LAB
ATRIAL RATE: 83 BPM
P AXIS: 81 DEGREES
P OFFSET: 210 MS
P ONSET: 166 MS
PR INTERVAL: 112 MS
Q ONSET: 222 MS
QRS COUNT: 13 BEATS
QRS DURATION: 76 MS
QT INTERVAL: 368 MS
QTC CALCULATION(BAZETT): 432 MS
QTC FREDERICIA: 410 MS
R AXIS: 82 DEGREES
T AXIS: 59 DEGREES
T OFFSET: 406 MS
VENTRICULAR RATE: 83 BPM

## 2023-10-27 LAB — BACTERIA UR CULT: NORMAL

## 2023-11-02 ENCOUNTER — ROUTINE PRENATAL (OUTPATIENT)
Dept: OBSTETRICS AND GYNECOLOGY | Facility: CLINIC | Age: 18
End: 2023-11-02
Payer: COMMERCIAL

## 2023-11-02 ENCOUNTER — ANCILLARY PROCEDURE (OUTPATIENT)
Dept: RADIOLOGY | Facility: CLINIC | Age: 18
End: 2023-11-02
Payer: COMMERCIAL

## 2023-11-02 VITALS — WEIGHT: 124 LBS | DIASTOLIC BLOOD PRESSURE: 70 MMHG | SYSTOLIC BLOOD PRESSURE: 106 MMHG | BODY MASS INDEX: 21.28 KG/M2

## 2023-11-02 DIAGNOSIS — Z3A.08 8 WEEKS GESTATION OF PREGNANCY (HHS-HCC): ICD-10-CM

## 2023-11-02 DIAGNOSIS — Z34.90 ENCOUNTER FOR SUPERVISION OF NORMAL PREGNANCY, UNSPECIFIED, UNSPECIFIED TRIMESTER (HHS-HCC): ICD-10-CM

## 2023-11-02 PROBLEM — A54.9 GONORRHEA: Status: RESOLVED | Noted: 2023-10-04 | Resolved: 2023-11-02

## 2023-11-02 PROBLEM — N93.9 ABNORMAL UTERINE BLEEDING: Status: RESOLVED | Noted: 2023-10-04 | Resolved: 2023-11-02

## 2023-11-02 PROBLEM — R06.09 DOE (DYSPNEA ON EXERTION): Status: RESOLVED | Noted: 2023-10-04 | Resolved: 2023-11-02

## 2023-11-02 PROBLEM — F41.9 ANXIETY: Status: RESOLVED | Noted: 2023-10-04 | Resolved: 2023-11-02

## 2023-11-02 PROBLEM — R79.89 ELEVATED LFTS: Status: RESOLVED | Noted: 2023-10-04 | Resolved: 2023-11-02

## 2023-11-02 PROCEDURE — 76816 OB US FOLLOW-UP PER FETUS: CPT

## 2023-11-02 PROCEDURE — 76816 OB US FOLLOW-UP PER FETUS: CPT | Performed by: OBSTETRICS & GYNECOLOGY

## 2023-11-02 ASSESSMENT — ENCOUNTER SYMPTOMS
GASTROINTESTINAL NEGATIVE: 0
RESPIRATORY NEGATIVE: 0
MUSCULOSKELETAL NEGATIVE: 0
ALLERGIC/IMMUNOLOGIC NEGATIVE: 0
EYES NEGATIVE: 0
CONSTITUTIONAL NEGATIVE: 0
CARDIOVASCULAR NEGATIVE: 0
PSYCHIATRIC NEGATIVE: 0
NEUROLOGICAL NEGATIVE: 0
HEMATOLOGIC/LYMPHATIC NEGATIVE: 0
ENDOCRINE NEGATIVE: 0

## 2023-11-02 NOTE — PROGRESS NOTES
Pt left prior to being seen. PNLs reviewed and wnl. Dating based on CRL @ 6w6d. Follow-up in 4 weeks for RPN.

## 2023-11-08 ENCOUNTER — TELEPHONE (OUTPATIENT)
Dept: OBSTETRICS AND GYNECOLOGY | Facility: CLINIC | Age: 18
End: 2023-11-08
Payer: COMMERCIAL

## 2023-11-08 DIAGNOSIS — O21.9 NAUSEA AND VOMITING IN PREGNANCY PRIOR TO 22 WEEKS GESTATION (HHS-HCC): Primary | ICD-10-CM

## 2023-11-08 RX ORDER — ONDANSETRON 4 MG/1
8 TABLET, FILM COATED ORAL EVERY 8 HOURS PRN
Qty: 20 TABLET | Refills: 0 | Status: SHIPPED | OUTPATIENT
Start: 2023-11-08 | End: 2023-11-15

## 2023-11-08 NOTE — TELEPHONE ENCOUNTER
Pt calling for rx for zofran for nausea. She is able to keep some food and liquids down and is stated she took it in the past- msg sent to Dr Sloan.

## 2023-11-21 ENCOUNTER — TELEPHONE (OUTPATIENT)
Dept: PEDIATRICS | Facility: CLINIC | Age: 18
End: 2023-11-21
Payer: COMMERCIAL

## 2023-11-21 NOTE — TELEPHONE ENCOUNTER
----- Message from Levi Newberry sent at 11/21/2023  4:21 PM EST -----  Regarding: Work accommodations  Contact: [phone]  joanna regalado

## 2023-11-24 ENCOUNTER — TELEPHONE (OUTPATIENT)
Dept: OBSTETRICS AND GYNECOLOGY | Facility: CLINIC | Age: 18
End: 2023-11-24
Payer: COMMERCIAL

## 2023-11-24 ENCOUNTER — DOCUMENTATION (OUTPATIENT)
Dept: OBSTETRICS AND GYNECOLOGY | Facility: CLINIC | Age: 18
End: 2023-11-24
Payer: COMMERCIAL

## 2023-11-24 NOTE — TELEPHONE ENCOUNTER
MSG from pt regarding needing work restrcitons letter. Pt left prior to being seen by provider last visit. Basic work restrictions letter sent via [employer] per pt request and additional   restrictions to be reviewed at next visit 11/30 per pt request and understanding.

## 2023-11-24 NOTE — LETTER
November 24, 2023     Sunilqiana SIMSRyne Newberry    Patient: Levi Newberry   YOB: 2005           Dear Employer,  Pregnancy Restrictions for this patient will include at this time:  No ladders.  Availability to have time to use the bathroom when needed. Allow 15 minutes for every 4 hours as needed.  No heavy lifting /pushing /pulling by self. No more than 25 pounds by self.  40 hour work week.   Time to attend scheduled OB visits.   Additional restrictions may be reviewed at next upcoming visit on 11/30/23.      Sincerely,     Shellie Trevino, ARNULFO     [phone]  [phone]      CC: No Recipients  ______________________________________________________________________________________

## 2023-11-28 ENCOUNTER — TELEPHONE (OUTPATIENT)
Dept: OBSTETRICS AND GYNECOLOGY | Facility: CLINIC | Age: 18
End: 2023-11-28
Payer: COMMERCIAL

## 2023-11-28 NOTE — TELEPHONE ENCOUNTER
----- Message from Levi Newberry sent at 11/18/2023 12:36 PM EST -----  Regarding: Discharge   Contact: [phone]  I’m 11 weeks pregnant and is experiencing orange discharge . Should I be concerned ??

## 2023-11-28 NOTE — TELEPHONE ENCOUNTER
Spoke with pt-states vaginal discharge has resolved.   Pt denies any vaginal complaints at this time.  Pt's next OB visit is 11/30.

## 2023-11-30 ENCOUNTER — ROUTINE PRENATAL (OUTPATIENT)
Dept: OBSTETRICS AND GYNECOLOGY | Facility: CLINIC | Age: 18
End: 2023-11-30
Payer: COMMERCIAL

## 2023-11-30 ENCOUNTER — ANCILLARY PROCEDURE (OUTPATIENT)
Dept: RADIOLOGY | Facility: CLINIC | Age: 18
End: 2023-11-30
Payer: COMMERCIAL

## 2023-11-30 VITALS — DIASTOLIC BLOOD PRESSURE: 79 MMHG | WEIGHT: 121 LBS | BODY MASS INDEX: 20.77 KG/M2 | SYSTOLIC BLOOD PRESSURE: 118 MMHG

## 2023-11-30 DIAGNOSIS — O21.9 NAUSEA AND VOMITING IN PREGNANCY PRIOR TO 22 WEEKS GESTATION (HHS-HCC): ICD-10-CM

## 2023-11-30 DIAGNOSIS — I49.9 CARDIAC ARRHYTHMIA, UNSPECIFIED CARDIAC ARRHYTHMIA TYPE: ICD-10-CM

## 2023-11-30 DIAGNOSIS — O26.891 RH NEGATIVE STATUS DURING PREGNANCY IN FIRST TRIMESTER (HHS-HCC): ICD-10-CM

## 2023-11-30 DIAGNOSIS — Z3A.12 12 WEEKS GESTATION OF PREGNANCY (HHS-HCC): Primary | ICD-10-CM

## 2023-11-30 DIAGNOSIS — Z86.79 HISTORY OF CARDIAC ARRHYTHMIA: ICD-10-CM

## 2023-11-30 DIAGNOSIS — O23.41 URINARY TRACT INFECTION IN MOTHER DURING FIRST TRIMESTER OF PREGNANCY (HHS-HCC): ICD-10-CM

## 2023-11-30 DIAGNOSIS — F33.9 EPISODE OF RECURRENT MAJOR DEPRESSIVE DISORDER, UNSPECIFIED DEPRESSION EPISODE SEVERITY (CMS-HCC): ICD-10-CM

## 2023-11-30 DIAGNOSIS — F12.10 CANNABIS ABUSE, EPISODIC: ICD-10-CM

## 2023-11-30 DIAGNOSIS — Z36.86 ENCOUNTER FOR ANTENATAL SCREENING FOR CERVICAL LENGTH (HHS-HCC): ICD-10-CM

## 2023-11-30 DIAGNOSIS — Z67.91 RH NEGATIVE STATUS DURING PREGNANCY IN FIRST TRIMESTER (HHS-HCC): ICD-10-CM

## 2023-11-30 PROBLEM — Z91.51 HISTORY OF SUICIDAL BEHAVIOR: Status: ACTIVE | Noted: 2022-11-30

## 2023-11-30 PROBLEM — F32.2 MDD (MAJOR DEPRESSIVE DISORDER), SINGLE EPISODE, SEVERE (MULTI): Chronic | Status: ACTIVE | Noted: 2022-02-25

## 2023-11-30 PROBLEM — F41.1 GENERALIZED ANXIETY DISORDER: Status: ACTIVE | Noted: 2022-03-25

## 2023-11-30 PROCEDURE — 99214 OFFICE O/P EST MOD 30 MIN: CPT | Performed by: ADVANCED PRACTICE MIDWIFE

## 2023-11-30 PROCEDURE — 76816 OB US FOLLOW-UP PER FETUS: CPT | Performed by: STUDENT IN AN ORGANIZED HEALTH CARE EDUCATION/TRAINING PROGRAM

## 2023-11-30 PROCEDURE — 76816 OB US FOLLOW-UP PER FETUS: CPT

## 2023-11-30 PROCEDURE — 99214 OFFICE O/P EST MOD 30 MIN: CPT | Mod: TH | Performed by: ADVANCED PRACTICE MIDWIFE

## 2023-11-30 PROCEDURE — 87086 URINE CULTURE/COLONY COUNT: CPT | Performed by: ADVANCED PRACTICE MIDWIFE

## 2023-11-30 RX ORDER — MICONAZOLE NITRATE 2 %
1 CREAM WITH APPLICATOR VAGINAL
COMMUNITY
Start: 2023-11-11 | End: 2023-12-15 | Stop reason: HOSPADM

## 2023-11-30 RX ORDER — CEPHALEXIN 500 MG/1
500 CAPSULE ORAL 2 TIMES DAILY
COMMUNITY
Start: 2023-11-11 | End: 2023-11-18

## 2023-11-30 RX ORDER — METOCLOPRAMIDE 10 MG/1
10 TABLET ORAL ONCE AS NEEDED
Qty: 1 TABLET | Refills: 0 | Status: SHIPPED | OUTPATIENT
Start: 2023-11-30 | End: 2024-01-24 | Stop reason: SDUPTHER

## 2023-11-30 ASSESSMENT — ENCOUNTER SYMPTOMS
ENDOCRINE NEGATIVE: 0
MUSCULOSKELETAL NEGATIVE: 0
PSYCHIATRIC NEGATIVE: 0
ALLERGIC/IMMUNOLOGIC NEGATIVE: 0
HEMATOLOGIC/LYMPHATIC NEGATIVE: 0
CARDIOVASCULAR NEGATIVE: 0
GASTROINTESTINAL NEGATIVE: 0
CONSTITUTIONAL NEGATIVE: 0
EYES NEGATIVE: 0
NEUROLOGICAL NEGATIVE: 0
RESPIRATORY NEGATIVE: 0

## 2023-11-30 NOTE — ASSESSMENT & PLAN NOTE
Referral to Dr. Tong  Counseled on EPS with Reglan patient aware to stop if facial symptoms occur

## 2023-11-30 NOTE — LETTER
11/30/2023    To Whom It May Concern:    Levi BeeElizabethLeah is being followed for her pregnancy at [facility].  Estimated Date of Delivery: 6/8/24    Sincerely,        EVE Baxter  [facility]

## 2023-11-30 NOTE — LETTER
November 30, 2023     Patient: Levi Newberry   YOB: 2005   Date of Visit: 11/30/2023       To Whom It May Concern:    It is my medical opinion that Levi Newberry may return to light duty immediately with the following restrictions: no lifting over 25 pounds  .    If you have any questions or concerns, please don't hesitate to call.         Sincerely,        EVE Baxter    CC: No Recipients

## 2023-11-30 NOTE — ASSESSMENT & PLAN NOTE
>>ASSESSMENT AND PLAN FOR HISTORY OF CARDIAC ARRHYTHMIA WRITTEN ON 11/30/2023 11:51 AM BY EVE SONI    Referral to Dr. Tong  Counseled on EPS with Reglan patient aware to stop if facial symptoms occur

## 2023-11-30 NOTE — PROGRESS NOTES
Assessment/Plan   Problem List Items Addressed This Visit             ICD-10-CM       Ob-Gyn Problems    8 weeks gestation of pregnancy - Primary Z3A.08    Relevant Medications    metoclopramide (Reglan) 10 mg tablet    Rh negative status during pregnancy in first trimester O26.891, Z67.91     Rhogam at 28wk            Other    Cannabis abuse, episodic F12.10     Counseling provided- will retest 3rd trimester- patient agreeable          History of cardiac arrhythmia Z86.79     Referral to Dr. Tong  Counseled on EPS with Reglan patient aware to stop if facial symptoms occur          Major depressive disorder F32.9     Referral to Dr. Tong  On lexapro 20mg not a ton of help per pt          Other Visit Diagnoses         Codes    Urinary tract infection in mother during first trimester of pregnancy     O23.41    Relevant Orders    Urine Culture    Nausea and vomiting in pregnancy prior to 22 weeks gestation     O21.9    Relevant Medications    metoclopramide (Reglan) 10 mg tablet    Cardiac arrhythmia, unspecified cardiac arrhythmia type     I49.9    Relevant Orders    Referral to Cardiology            Lab results reviewed.  Anatomy US ordered  Reviewed NIPT results [facility]  Reviewed warning signs and when to call provider  Follow up in 4 weeks for a routine prenatal visit.    Xiao RICKIE Kim, GRACE-DEREK    Subjective   Levi Newberry is a 17 y.o.  at 12w5d with a working estimated date of delivery of 2024, by Ultrasound who presents for a routine prenatal visit. She denies vaginal bleeding, abdominal pain, leakage of fluid.     Her pregnancy is complicated by:  Pregnancy Problems (from 10/05/23 to present)       Problem Noted Resolved    8 weeks gestation of pregnancy 10/5/2023 by Cristal Sloan MD No    Pregnancy, location unknown 10/5/2023 by Cristal Sloan MD 10/11/2023 by Cristal Sloan MD    Overview Addendum 10/7/2023 10:17 AM by Cristal Sloan MD     - LMP uncertain, ~6-8wks   - IUP not visualized on  BSUS  - TVUS on 10/6 with gestational sac visualized, ~5wks.                   Objective   Physical Exam  Weight: 54.9 kg (121 lb), Pregravid BMI: Could not be calculated  TWG: Not found.   Expected Total Weight Gain: Could not be calculated   BP: 118/79 (pluse 80)  Fetal Heart Rate: 152      Postpartum Depression: High Risk (10/5/2023)    [place]  Depression Scale     Last EPDS Total Score: 13     Last EPDS Self Harm Result: Never        Prenatal Labs  Lab Results   Component Value Date    HGB 12.2 10/22/2023    HCT 35.4 (L) 10/22/2023    ABO O 10/05/2023    HEPBSAG Nonreactive 10/05/2023     NIPT: negative XX    Imaging  The most recent ultrasound was performed on The most recent ultrasound study is not finalized with a study GA of The most recent ultrasound study is not finalized.  The most recent ultrasound study is not finalized  The most recent ultrasound study is not finalized

## 2023-12-01 ENCOUNTER — APPOINTMENT (OUTPATIENT)
Dept: RADIOLOGY | Facility: CLINIC | Age: 18
End: 2023-12-01
Payer: COMMERCIAL

## 2023-12-01 LAB — BACTERIA UR CULT: NORMAL

## 2023-12-13 ENCOUNTER — TELEPHONE (OUTPATIENT)
Dept: OBSTETRICS AND GYNECOLOGY | Facility: CLINIC | Age: 18
End: 2023-12-13
Payer: COMMERCIAL

## 2023-12-13 NOTE — TELEPHONE ENCOUNTER
----- Message from Shellie Trevino RN sent at 12/13/2023  1:26 PM EST -----  Regarding: FW: Bodanyel   Contact: [phone]  Seen by Simon WILKERSON pt. Thanks  ----- Message -----  From: Malaika Bragg RN  Sent: 12/13/2023  11:27 AM EST  To: Shellie Trevino RN  Subject: FW: Boils                                          ----- Message -----  From: Levi Newberry  Sent: 12/13/2023  11:17 AM EST  To: [facility] [id number] [id number] Clinical Support Staff  Subject: Boils                                            Every since the middle / end of October I’ve been having a reoccurring boil on my vaginal area that’s very painful . I’m not sure if it’s infected or would need to be drained by a doctor but it’s VERY painful & keeps coming back . I’ve drained it once but it came right back . It goes a way for a few days or a week sometimes but never fully goes away & it’s causing me so much pain .

## 2023-12-13 NOTE — TELEPHONE ENCOUNTER
TELEPHONE CALL TO PATIENT  Identified by name and date of birth.  States boil very painful, has tried using warm compresses.  Scheduled  for 12/15 for sick visit  Patient verbalizes understanding

## 2023-12-15 ENCOUNTER — ROUTINE PRENATAL (OUTPATIENT)
Dept: OBSTETRICS AND GYNECOLOGY | Facility: CLINIC | Age: 18
End: 2023-12-15
Payer: COMMERCIAL

## 2023-12-15 VITALS
WEIGHT: 126 LBS | BODY MASS INDEX: 21.63 KG/M2 | DIASTOLIC BLOOD PRESSURE: 76 MMHG | HEART RATE: 80 BPM | SYSTOLIC BLOOD PRESSURE: 123 MMHG

## 2023-12-15 DIAGNOSIS — Z3A.14 14 WEEKS GESTATION OF PREGNANCY (HHS-HCC): ICD-10-CM

## 2023-12-15 DIAGNOSIS — L02.224 BOIL OF INGUINAL REGION: ICD-10-CM

## 2023-12-15 DIAGNOSIS — Z34.02 ENCOUNTER FOR SUPERVISION OF NORMAL PRIMIGRAVIDA IN SECOND TRIMESTER, ANTEPARTUM (HHS-HCC): Primary | ICD-10-CM

## 2023-12-15 PROCEDURE — 99213 OFFICE O/P EST LOW 20 MIN: CPT | Mod: TH | Performed by: NURSE PRACTITIONER

## 2023-12-15 PROCEDURE — 99213 OFFICE O/P EST LOW 20 MIN: CPT | Performed by: NURSE PRACTITIONER

## 2023-12-15 ASSESSMENT — PAIN SCALES - GENERAL: PAINLEVEL_OUTOF10: 0 - NO PAIN

## 2023-12-15 ASSESSMENT — PAIN - FUNCTIONAL ASSESSMENT: PAIN_FUNCTIONAL_ASSESSMENT: 0-10

## 2023-12-15 NOTE — PROGRESS NOTES
Assessment/Plan   Diagnoses and all orders for this visit:  Encounter for supervision of normal primigravida in second trimester, antepartum  14 weeks gestation of pregnancy  Boil of inguinal region    Encouraged continued use of warm compress to help boil fully drain. No antibiotics indicated at this time.  Discussed s/sx of infection and advised to call or present to triage with concerns  Reviewed warning signs and when to call provider  Follow up in 2 weeks for a routine prenatal visit.    Nanci Matamoros, EVE, APRN-CNP    Subjective   Levi BETHANY RicaLeah is a 17 y.o.  at 14w6d with a working estimated date of delivery of 2024, by Ultrasound who presents for OB sick visit. Patient reports recurrent boil, initial onset in October. Reports pain with palpation and certain positions. States she felt it drain a couple days ago.     Her pregnancy is complicated by:  Pregnancy Problems (from 10/05/23 to present)       Problem Noted Resolved    Rh negative status during pregnancy in first trimester 2023 by EVE Baxter No    Overview Signed 2023 11:34 AM by EVE Baxter     Rhogam at 28wk         Cannabis abuse, episodic 2023 by EVE Baxter No    Overview Signed 2023 11:53 AM by EVE Baxter     + Utox from [facility] -patient agreeable to stopping and will retest early 3rd trimester         Major depressive disorder 10/4/2023 by Kassi Jeffery No    Overview Signed 10/5/2023  1:15 PM by Cristal Sloan MD     - taking lexapro 20mg   - follows with psych & counseling   - h/o suicide attempts via overdose, last in 2022         History of cardiac arrhythmia 10/4/2023 by EVE Baxter No    Overview Addendum 2023 11:52 AM by EVE Baxter     Referral to cards-          Pregnancy, location unknown 10/5/2023 by Cristal Sloan MD 10/11/2023 by Cristal Sloan MD    Overview  Addendum 10/7/2023 10:17 AM by Cristal Sloan MD     - LMP uncertain, ~6-8wks   - IUP not visualized on BSUS  - TVUS on 10/6 with gestational sac visualized, ~5wks.           Objective   Physical Exam  Weight: 57.2 kg (126 lb), Pregravid BMI: 20.73  TW.358 kg (5 lb 3.2 oz)   Expected Total Weight Gain: 11.5 kg (25 lb)-16 kg (35 lb)   BP: 123/76  Fetal Heart Rate: 145 Fundal Height (cm):  (cwd)    Prenatal Labs  Lab Results   Component Value Date    HGB 12.2 10/22/2023    HCT 35.4 (L) 10/22/2023     10/22/2023    ABO O 10/05/2023    LABRH NEG 10/05/2023    NEISSGONOAMP Negative 10/22/2023    CHLAMTRACAMP Negative 10/22/2023    SYPHT Nonreactive 10/05/2023    HEPBSAG Nonreactive 10/05/2023    HIV1X2 Nonreactive 10/22/2023    URINECULTURE No significant growth 2023     Physical Exam  Constitutional:       General: She is not in acute distress.     Appearance: Normal appearance.   Abdominal:      Tenderness: There is abdominal tenderness in the suprapubic area.          Comments: ~ 2.5cm boil present on R mons pubis, slight tenderness with palpation, without erythema, warmth, no drainage visualized.    Neurological:      Mental Status: She is alert.   Skin:     General: Skin is warm and dry.   Psychiatric:         Mood and Affect: Mood normal.         Behavior: Behavior normal.         Thought Content: Thought content normal.         Judgment: Judgment normal.   Vitals reviewed.

## 2023-12-28 ENCOUNTER — ROUTINE PRENATAL (OUTPATIENT)
Dept: OBSTETRICS AND GYNECOLOGY | Facility: CLINIC | Age: 18
End: 2023-12-28
Payer: COMMERCIAL

## 2023-12-28 VITALS — SYSTOLIC BLOOD PRESSURE: 105 MMHG | BODY MASS INDEX: 21.3 KG/M2 | DIASTOLIC BLOOD PRESSURE: 72 MMHG | WEIGHT: 124.1 LBS

## 2023-12-28 DIAGNOSIS — Z3A.08 8 WEEKS GESTATION OF PREGNANCY (HHS-HCC): ICD-10-CM

## 2023-12-28 DIAGNOSIS — F33.9 EPISODE OF RECURRENT MAJOR DEPRESSIVE DISORDER, UNSPECIFIED DEPRESSION EPISODE SEVERITY (CMS-HCC): ICD-10-CM

## 2023-12-28 DIAGNOSIS — O26.891 RH NEGATIVE STATUS DURING PREGNANCY IN FIRST TRIMESTER (HHS-HCC): ICD-10-CM

## 2023-12-28 DIAGNOSIS — Z67.91 RH NEGATIVE STATUS DURING PREGNANCY IN FIRST TRIMESTER (HHS-HCC): ICD-10-CM

## 2023-12-28 DIAGNOSIS — Z86.79 HISTORY OF CARDIAC ARRHYTHMIA: ICD-10-CM

## 2023-12-28 DIAGNOSIS — F41.1 GENERALIZED ANXIETY DISORDER: Primary | ICD-10-CM

## 2023-12-28 DIAGNOSIS — F12.10 CANNABIS ABUSE, EPISODIC: ICD-10-CM

## 2023-12-28 DIAGNOSIS — I47.29 NONSUSTAINED VENTRICULAR TACHYCARDIA (MULTI): ICD-10-CM

## 2023-12-28 PROCEDURE — 99213 OFFICE O/P EST LOW 20 MIN: CPT | Performed by: ADVANCED PRACTICE MIDWIFE

## 2023-12-28 PROCEDURE — 99213 OFFICE O/P EST LOW 20 MIN: CPT | Mod: TH | Performed by: ADVANCED PRACTICE MIDWIFE

## 2023-12-28 ASSESSMENT — ENCOUNTER SYMPTOMS
MUSCULOSKELETAL NEGATIVE: 0
RESPIRATORY NEGATIVE: 0
EYES NEGATIVE: 0
PSYCHIATRIC NEGATIVE: 0
ALLERGIC/IMMUNOLOGIC NEGATIVE: 0
ENDOCRINE NEGATIVE: 0
HEMATOLOGIC/LYMPHATIC NEGATIVE: 0
GASTROINTESTINAL NEGATIVE: 0
CONSTITUTIONAL NEGATIVE: 0
NEUROLOGICAL NEGATIVE: 0
CARDIOVASCULAR NEGATIVE: 0

## 2023-12-28 NOTE — ASSESSMENT & PLAN NOTE
>>ASSESSMENT AND PLAN FOR NONSUSTAINED VENTRICULAR TACHYCARDIA (MULTI) WRITTEN ON 12/28/2023 10:22 AM BY EVE SONI    Patient has not seen cards     >>ASSESSMENT AND PLAN FOR HISTORY OF CARDIAC ARRHYTHMIA WRITTEN ON 12/28/2023 10:21 AM BY EVE SONI    Has not  yet made appt

## 2023-12-28 NOTE — PROGRESS NOTES
Assessment/Plan   Problem List Items Addressed This Visit             ICD-10-CM       Ob-Gyn Problems    8 weeks gestation of pregnancy Z3A.08    Rh negative status during pregnancy in first trimester O26.891, Z67.91       Other    Cannabis abuse, episodic F12.10    Generalized anxiety disorder - Primary F41.1    History of cardiac arrhythmia Z86.79     Has not  yet made appt          Major depressive disorder F32.9    Nonsustained ventricular tachycardia (CMS/HCC) I47.29     Patient has not seen cards             Labs reviewed.  Anatomy US scheduled  Benefits of breastfeeding discussed with patient, breast pump ordered  Discussed Tdap vaccine, VIS handout provided, patient accepted  Discussed flu vaccine, VIS handout provided, patient accepted  Rh negative status; Rhogam [id number]    Declined vaccines in pregnancy  Cff DNA is negative - done at [facility]    Reviewed s/sx of PTL, warning signs, fetal movement counts, and when to call provider  Follow up in 4 weeks for a routine prenatal visit.    EVE Baxter    Subjective     Levi Newberry is a 18 y.o.  at 16w5d with a working estimated date of delivery of 2024, by Ultrasound who presents for a routine prenatal visit. She denies vaginal bleeding, leakage of fluid, decreased fetal movements, or contractions.    Her pregnancy is complicated by:  Pregnancy Problems (from 10/05/23 to present)       Problem Noted Resolved    Rh negative status during pregnancy in first trimester 2023 by EVE Baxter No    Priority:  Medium      Overview Signed 2023 11:34 AM by EVE Baxter     Rhogam at 28wk         Cannabis abuse, episodic 2023 by EVE Baxter No    Priority:  Medium      Overview Signed 2023 11:53 AM by EVE Baxter     + Utox from [facility] -patient agreeable to stopping and will retest early 3rd trimester         8 weeks gestation of pregnancy  "10/5/2023 by Cristal Sloan MD No    Priority:  Medium      Major depressive disorder 10/4/2023 by Kassi Jeffery No    Priority:  Medium      Overview Signed 10/5/2023  1:15 PM by Cristal Sloan MD     - taking lexapro 20mg   - follows with psych & counseling   - h/o suicide attempts via overdose, last in 2022         History of cardiac arrhythmia 10/4/2023 by EVE Baxter No    Priority:  Medium      Overview Addendum 2023 11:52 AM by EVE Baxter     Referral to [facility]-          Pregnancy, location unknown 10/5/2023 by Cristal Sloan MD 10/11/2023 by Cristal Sloan MD    Overview Addendum 10/7/2023 10:17 AM by Cristal Sloan MD     - LMP uncertain, ~6-8wks   - IUP not visualized on BSUS  - TVUS on 10/6 with gestational sac visualized, ~5wks.                   Objective   Physical Exam  Weight: 56.3 kg (124 lb 1.6 oz)  Expected Total Weight Gain: 11.5 kg (25 lb)-16 kg (35 lb)   Pregravid BMI: 20.73  BP: 105/72 (Pluse-86)         Postpartum Depression: High Risk (10/5/2023)    [place]  Depression Scale     Last EPDS Total Score: 13     Last EPDS Self Harm Result: Never       Prenatal Labs  Lab Results   Component Value Date    HGB 12.2 10/22/2023    HCT 35.4 (L) 10/22/2023    ABO O 10/05/2023    HEPBSAG Nonreactive 10/05/2023     No results found for: \"GLUC1P\", \"GL3\"     Imaging  The most recent ultrasound was performed on The most recent ultrasound study is not finalized with a study GA of The most recent ultrasound study is not finalized and EFW of The most recent ultrasound study is not finalized.  The most recent ultrasound study is not finalized  The most recent ultrasound study is not finalized  "

## 2024-01-12 ENCOUNTER — PATIENT MESSAGE (OUTPATIENT)
Dept: OBSTETRICS AND GYNECOLOGY | Facility: CLINIC | Age: 19
End: 2024-01-12
Payer: COMMERCIAL

## 2024-01-12 DIAGNOSIS — K21.9 GASTROESOPHAGEAL REFLUX DISEASE WITHOUT ESOPHAGITIS: Primary | ICD-10-CM

## 2024-01-15 ENCOUNTER — ANCILLARY PROCEDURE (OUTPATIENT)
Dept: RADIOLOGY | Facility: CLINIC | Age: 19
End: 2024-01-15
Payer: COMMERCIAL

## 2024-01-15 DIAGNOSIS — Z36.3 ENCOUNTER FOR ANTENATAL SCREENING FOR MALFORMATIONS (HHS-HCC): ICD-10-CM

## 2024-01-15 PROCEDURE — 76805 OB US >/= 14 WKS SNGL FETUS: CPT | Performed by: OBSTETRICS & GYNECOLOGY

## 2024-01-15 PROCEDURE — 76811 OB US DETAILED SNGL FETUS: CPT

## 2024-01-16 DIAGNOSIS — Z3A.12 12 WEEKS GESTATION OF PREGNANCY (HHS-HCC): ICD-10-CM

## 2024-01-16 DIAGNOSIS — O21.9 NAUSEA AND VOMITING IN PREGNANCY PRIOR TO 22 WEEKS GESTATION (HHS-HCC): ICD-10-CM

## 2024-01-16 RX ORDER — FAMOTIDINE 20 MG/1
20 TABLET, FILM COATED ORAL 2 TIMES DAILY
Qty: 60 TABLET | Refills: 11 | Status: SHIPPED | OUTPATIENT
Start: 2024-01-16 | End: 2025-01-15

## 2024-01-17 DIAGNOSIS — R35.0 URINE FREQUENCY: Primary | ICD-10-CM

## 2024-01-17 PROBLEM — Z86.19 HISTORY OF GONORRHEA: Status: RESOLVED | Noted: 2023-11-16 | Resolved: 2024-01-17

## 2024-01-17 PROBLEM — R45.89 SUICIDE RISK: Status: RESOLVED | Noted: 2022-02-25 | Resolved: 2024-01-17

## 2024-01-17 PROBLEM — O99.320 MARIJUANA USE DURING PREGNANCY (HHS-HCC): Status: RESOLVED | Noted: 2023-12-14 | Resolved: 2024-01-17

## 2024-01-17 PROBLEM — F33.2 SEVERE EPISODE OF RECURRENT MAJOR DEPRESSIVE DISORDER, WITHOUT PSYCHOTIC FEATURES (MULTI): Status: RESOLVED | Noted: 2023-10-04 | Resolved: 2024-01-17

## 2024-01-17 PROBLEM — F12.90 MARIJUANA USE DURING PREGNANCY (HHS-HCC): Status: RESOLVED | Noted: 2023-12-14 | Resolved: 2024-01-17

## 2024-01-17 PROBLEM — F33.41 RECURRENT MAJOR DEPRESSIVE DISORDER, IN PARTIAL REMISSION (CMS-HCC): Chronic | Status: RESOLVED | Noted: 2022-03-25 | Resolved: 2024-01-17

## 2024-01-17 PROBLEM — R21 RASH OF GENITALIA: Status: RESOLVED | Noted: 2023-04-13 | Resolved: 2024-01-17

## 2024-01-17 PROBLEM — Z86.19 HISTORY OF CHLAMYDIA: Status: RESOLVED | Noted: 2023-11-16 | Resolved: 2024-01-17

## 2024-01-17 PROBLEM — O09.899 HIGH RISK TEEN PREGNANCY, ANTEPARTUM (HHS-HCC): Status: RESOLVED | Noted: 2023-12-14 | Resolved: 2024-01-17

## 2024-01-17 PROBLEM — H57.10 EYE PAIN: Status: RESOLVED | Noted: 2024-01-17 | Resolved: 2024-01-17

## 2024-01-17 NOTE — PROGRESS NOTES
Call from patient, states was seen at an Urgent care center on Sunday, for exposure to STD.  Was given Zithromax, and Ceftriaxone.   Received a call today telling her she has a UTI, and want her OB to treat.    Urinary frequency only symptom  Discussed with CELESTINO PRADO, to come to  lab and leave a specimen for culture.  Patient verbalizes understanding.

## 2024-01-24 DIAGNOSIS — Z3A.12 12 WEEKS GESTATION OF PREGNANCY (HHS-HCC): ICD-10-CM

## 2024-01-24 DIAGNOSIS — R51.9 PREGNANCY HEADACHE IN SECOND TRIMESTER (HHS-HCC): Primary | ICD-10-CM

## 2024-01-24 DIAGNOSIS — O26.892 PREGNANCY HEADACHE IN SECOND TRIMESTER (HHS-HCC): Primary | ICD-10-CM

## 2024-01-24 DIAGNOSIS — O21.9 NAUSEA AND VOMITING IN PREGNANCY PRIOR TO 22 WEEKS GESTATION (HHS-HCC): ICD-10-CM

## 2024-01-24 RX ORDER — METOCLOPRAMIDE 10 MG/1
10 TABLET ORAL EVERY 6 HOURS PRN
Qty: 40 TABLET | Refills: 0 | Status: SHIPPED | OUTPATIENT
Start: 2024-01-24 | End: 2024-03-25 | Stop reason: SDUPTHER

## 2024-01-25 ENCOUNTER — ROUTINE PRENATAL (OUTPATIENT)
Dept: OBSTETRICS AND GYNECOLOGY | Facility: CLINIC | Age: 19
End: 2024-01-25
Payer: COMMERCIAL

## 2024-01-25 VITALS — WEIGHT: 134.3 LBS | SYSTOLIC BLOOD PRESSURE: 108 MMHG | BODY MASS INDEX: 23.05 KG/M2 | DIASTOLIC BLOOD PRESSURE: 71 MMHG

## 2024-01-25 DIAGNOSIS — R30.0 DYSURIA: ICD-10-CM

## 2024-01-25 DIAGNOSIS — F12.10 CANNABIS ABUSE, EPISODIC: ICD-10-CM

## 2024-01-25 DIAGNOSIS — Z86.79 HISTORY OF CARDIAC ARRHYTHMIA: ICD-10-CM

## 2024-01-25 DIAGNOSIS — Z3A.20 20 WEEKS GESTATION OF PREGNANCY (HHS-HCC): Primary | ICD-10-CM

## 2024-01-25 DIAGNOSIS — O26.891 RH NEGATIVE STATUS DURING PREGNANCY IN FIRST TRIMESTER (HHS-HCC): ICD-10-CM

## 2024-01-25 DIAGNOSIS — F33.9 EPISODE OF RECURRENT MAJOR DEPRESSIVE DISORDER, UNSPECIFIED DEPRESSION EPISODE SEVERITY (CMS-HCC): ICD-10-CM

## 2024-01-25 DIAGNOSIS — Z67.91 RH NEGATIVE STATUS DURING PREGNANCY IN FIRST TRIMESTER (HHS-HCC): ICD-10-CM

## 2024-01-25 PROBLEM — Z3A.08 8 WEEKS GESTATION OF PREGNANCY (HHS-HCC): Status: RESOLVED | Noted: 2023-10-05 | Resolved: 2024-01-25

## 2024-01-25 PROCEDURE — 99214 OFFICE O/P EST MOD 30 MIN: CPT | Mod: TH | Performed by: ADVANCED PRACTICE MIDWIFE

## 2024-01-25 PROCEDURE — 87086 URINE CULTURE/COLONY COUNT: CPT | Performed by: ADVANCED PRACTICE MIDWIFE

## 2024-01-25 PROCEDURE — 99214 OFFICE O/P EST MOD 30 MIN: CPT | Performed by: ADVANCED PRACTICE MIDWIFE

## 2024-01-25 ASSESSMENT — ENCOUNTER SYMPTOMS
CONSTITUTIONAL NEGATIVE: 0
MUSCULOSKELETAL NEGATIVE: 0
HEMATOLOGIC/LYMPHATIC NEGATIVE: 0
GASTROINTESTINAL NEGATIVE: 0
EYES NEGATIVE: 0
ENDOCRINE NEGATIVE: 0
NEUROLOGICAL NEGATIVE: 0
RESPIRATORY NEGATIVE: 0
CARDIOVASCULAR NEGATIVE: 0
ALLERGIC/IMMUNOLOGIC NEGATIVE: 0
PSYCHIATRIC NEGATIVE: 0

## 2024-01-25 NOTE — PROGRESS NOTES
Assessment/Plan   Problem List Items Addressed This Visit             ICD-10-CM       Ob-Gyn Problems    RESOLVED: 8 weeks gestation of pregnancy - Primary Z3A.08    Rh negative status during pregnancy in first trimester O26.891, Z67.91       Other    Cannabis abuse, episodic F12.10    History of cardiac arrhythmia Z86.79    Major depressive disorder F32.9     Other Visit Diagnoses         Codes    Dysuria     R30.0    Relevant Orders    Urine Culture            Labs reviewed.  Metoclopramide- working for headaches  if do not resolve will refer to Neuro-  Questions answered     Reviewed s/sx of PTL, warning signs, fetal movement counts, and when to call provider  Follow up in 4 weeks for a routine prenatal visit.    EVE Baxter    Subjective     Levi StraussLeah is a 18 y.o.  at 20w5d with a working estimated date of delivery of 2024, by Ultrasound who presents for a routine prenatal visit. She denies vaginal bleeding, leakage of fluid, decreased fetal movements, or contractions.    Her pregnancy is complicated by:  Pregnancy Problems (from 10/05/23 to present)       Problem Noted Resolved    Rh negative status during pregnancy in first trimester 2023 by EVE Baxter No    Priority:  Medium      Overview Signed 2023 11:34 AM by EVE Baxter     Rhogam at 28wk         Cannabis abuse, episodic 2023 by EVE Baxter No    Priority:  Medium      Overview Signed 2023 11:53 AM by EVE Baxter     + Utox from [facility] -patient agreeable to stopping and will retest early 3rd trimester         8 weeks gestation of pregnancy 10/5/2023 by Cristal Sloan MD No    Priority:  Medium      Major depressive disorder 10/4/2023 by Kassi Jeffery No    Priority:  Medium      Overview Signed 10/5/2023  1:15 PM by Cristal Sloan MD     - taking lexapro 20mg   - follows with psych & counseling   - h/o suicide  "attempts via overdose, last in 2022         History of cardiac arrhythmia 10/4/2023 by EVE Baxter No    Priority:  Medium      Overview Addendum 2023 11:52 AM by EVE Baxter     Referral to [facility]-          High risk teen pregnancy, antepartum 2023 by Dimple Nagy, RN 2024 by Dimple Nagy, RN    Marijuana use during pregnancy 2023 by Dimple Nagy RN 2024 by Dimple Nagy, ARNULFO    Pregnancy, location unknown 10/5/2023 by Cristal Sloan MD 10/11/2023 by Cristal Sloan MD    Overview Addendum 10/7/2023 10:17 AM by Cristal Sloan MD     - LMP uncertain, ~6-8wks   - IUP not visualized on BSUS  - TVUS on 10/6 with gestational sac visualized, ~5wks.                Objective   Physical Exam  Weight: 60.9 kg (134 lb 4.8 oz)  Expected Total Weight Gain: 11.5 kg (25 lb)-16 kg (35 lb)   Pregravid BMI: 20.73  BP: 108/71 (pluse-80)  Fetal Heart Rate: 145 Fundal Height (cm):  (@ umbillicus)    Postpartum Depression: High Risk (10/5/2023)    [place]  Depression Scale     Last EPDS Total Score: 13     Last EPDS Self Harm Result: Never       Prenatal Labs  Lab Results   Component Value Date    HGB 12.2 10/22/2023    HCT 35.4 (L) 10/22/2023    ABO O 10/05/2023    HEPBSAG Nonreactive 10/05/2023     No results found for: \"GLUC1P\", \"GL3\"     Imaging  The most recent ultrasound was performed on   with a study GA of   and EFW of  .        "

## 2024-01-26 LAB — BACTERIA UR CULT: NORMAL

## 2024-01-31 ENCOUNTER — APPOINTMENT (OUTPATIENT)
Dept: PRIMARY CARE | Facility: CLINIC | Age: 19
End: 2024-01-31
Payer: COMMERCIAL

## 2024-02-22 ENCOUNTER — ROUTINE PRENATAL (OUTPATIENT)
Dept: OBSTETRICS AND GYNECOLOGY | Facility: CLINIC | Age: 19
End: 2024-02-22
Payer: COMMERCIAL

## 2024-02-22 VITALS — SYSTOLIC BLOOD PRESSURE: 117 MMHG | BODY MASS INDEX: 24.68 KG/M2 | DIASTOLIC BLOOD PRESSURE: 72 MMHG | WEIGHT: 143.8 LBS

## 2024-02-22 DIAGNOSIS — Z67.91 RH NEGATIVE STATUS DURING PREGNANCY IN FIRST TRIMESTER (HHS-HCC): ICD-10-CM

## 2024-02-22 DIAGNOSIS — F12.10 CANNABIS ABUSE, EPISODIC: ICD-10-CM

## 2024-02-22 DIAGNOSIS — Z3A.24 24 WEEKS GESTATION OF PREGNANCY (HHS-HCC): ICD-10-CM

## 2024-02-22 DIAGNOSIS — Z86.79 HISTORY OF CARDIAC ARRHYTHMIA: ICD-10-CM

## 2024-02-22 DIAGNOSIS — N89.8 VAGINAL ODOR: ICD-10-CM

## 2024-02-22 DIAGNOSIS — O26.891 RH NEGATIVE STATUS DURING PREGNANCY IN FIRST TRIMESTER (HHS-HCC): ICD-10-CM

## 2024-02-22 DIAGNOSIS — O23.41 URINARY TRACT INFECTION IN MOTHER DURING FIRST TRIMESTER OF PREGNANCY (HHS-HCC): Primary | ICD-10-CM

## 2024-02-22 LAB
BILIRUBIN, POC: NEGATIVE
BLOOD URINE, POC: NEGATIVE
CLARITY, POC: CLEAR
COLOR, POC: YELLOW
GLUCOSE URINE, POC: NEGATIVE
KETONES, POC: NEGATIVE
LEUKOCYTE EST, POC: ABNORMAL
NITRITE, POC: NEGATIVE
PH, POC: 6.5
POC APPEARANCE OF BODY FLUID: CLEAR
SPECIFIC GRAVITY, POC: 1.02
URINE PROTEIN, POC: NEGATIVE
UROBILINOGEN, POC: 0.2

## 2024-02-22 PROCEDURE — 99214 OFFICE O/P EST MOD 30 MIN: CPT | Performed by: ADVANCED PRACTICE MIDWIFE

## 2024-02-22 PROCEDURE — 81002 URINALYSIS NONAUTO W/O SCOPE: CPT | Performed by: ADVANCED PRACTICE MIDWIFE

## 2024-02-22 PROCEDURE — 87800 DETECT AGNT MULT DNA DIREC: CPT | Performed by: ADVANCED PRACTICE MIDWIFE

## 2024-02-22 PROCEDURE — 99214 OFFICE O/P EST MOD 30 MIN: CPT | Mod: TH | Performed by: ADVANCED PRACTICE MIDWIFE

## 2024-02-22 PROCEDURE — 87205 SMEAR GRAM STAIN: CPT | Performed by: ADVANCED PRACTICE MIDWIFE

## 2024-02-22 PROCEDURE — 87661 TRICHOMONAS VAGINALIS AMPLIF: CPT | Mod: 59 | Performed by: ADVANCED PRACTICE MIDWIFE

## 2024-02-22 NOTE — PROGRESS NOTES
Assessment/Plan   Problem List Items Addressed This Visit             ICD-10-CM       Ob-Gyn Problems    Rh negative status during pregnancy in first trimester O26.891, Z67.91       Other    Cannabis abuse, episodic F12.10    History of cardiac arrhythmia Z86.79    Relevant Orders    Referral to Cardiology    Referral to Cardiology    ECG 12 lead (Ancillary Performed)     Other Visit Diagnoses         Codes    24 weeks gestation of pregnancy    -  Primary Z3A.24    Vaginal odor     N89.8    Relevant Orders    Vaginitis Gram Stain For Bacterial Vaginosis + Yeast            Labs reviewed.  Discussed diabetes screening and routine labs, to be completed next visit  Benefits of breastfeeding discussed with patient, breast pump ordered  Rh negative status; Rhogam next visit 28wk  Patient has not yet seen cardiology- reiterated importance for cardiology in pregnancy     Reviewed s/sx of PTL, warning signs, fetal movement counts, and when to call provider  Follow up in 4 weeks for a routine prenatal visit.    EVE Baxter    Richard     Levi SIMS BeeElizabethLeah is a 18 y.o.  at 24w5d with a working estimated date of delivery of 2024, by Ultrasound who presents for a routine prenatal visit. She denies vaginal bleeding, leakage of fluid, decreased fetal movements, or contractions.    Her pregnancy is complicated by:  Pregnancy Problems (from 10/05/23 to present)       Problem Noted Resolved    Rh negative status during pregnancy in first trimester 2023 by EVE Baxter No    Priority:  Medium      Overview Signed 2023 11:34 AM by EVE Baxter     Rhogam at 28wk         Cannabis abuse, episodic 2023 by EVE Baxter No    Priority:  Medium      Overview Signed 2023 11:53 AM by EVE Baxter     + Utox from [facility] -patient agreeable to stopping and will retest early 3rd trimester         Major depressive  "disorder 10/4/2023 by Kassi Jeffery No    Priority:  Medium      Overview Signed 10/5/2023  1:15 PM by Cristal Sloan MD     - taking lexapro 20mg   - follows with psych & counseling   - h/o suicide attempts via overdose, last in 2022         History of cardiac arrhythmia 10/4/2023 by EVE Baxter No    Priority:  Medium      Overview Addendum 2023 11:52 AM by EVE Baxter     Referral to cards-          8 weeks gestation of pregnancy 10/5/2023 by Cristal Sloan MD 2024 by EVE Baxter    Priority:  Medium      High risk teen pregnancy, antepartum 2023 by Dimple Nagy RN 2024 by Dimple Nagy, RN    Marijuana use during pregnancy 2023 by Dimple Nagy RN 2024 by Dimple Nagy, ARNULFO    Pregnancy, location unknown 10/5/2023 by Cristal Sloan MD 10/11/2023 by Cristal Sloan MD    Overview Addendum 10/7/2023 10:17 AM by Cristal Sloan MD     - LMP uncertain, ~6-8wks   - IUP not visualized on BSUS  - TVUS on 10/6 with gestational sac visualized, ~5wks.                   Objective   Physical Exam  Weight: 65.2 kg (143 lb 12.8 oz)  Expected Total Weight Gain: 11.5 kg (25 lb)-16 kg (35 lb)   Pregravid BMI: 20.73  BP: 117/72 (HR 88)  Fetal Heart Rate: 145 Fundal Height (cm): 26 cm    Postpartum Depression: High Risk (10/5/2023)    [place]  Depression Scale     Last EPDS Total Score: 13     Last EPDS Self Harm Result: Never       Prenatal Labs  Lab Results   Component Value Date    HGB 12.2 10/22/2023    HCT 35.4 (L) 10/22/2023    ABO O 10/05/2023    HEPBSAG Nonreactive 10/05/2023     No results found for: \"GLUC1P\", \"GL3\"     Imaging  The most recent ultrasound was performed on   with a study GA of   and EFW of  .        "

## 2024-02-23 LAB
C TRACH RRNA SPEC QL NAA+PROBE: NEGATIVE
N GONORRHOEA DNA SPEC QL PROBE+SIG AMP: NEGATIVE
T VAGINALIS RRNA SPEC QL NAA+PROBE: NEGATIVE

## 2024-02-24 LAB
CLUE CELLS VAG LPF-#/AREA: NORMAL /[LPF]
NUGENT SCORE: 0
YEAST VAG WET PREP-#/AREA: NORMAL

## 2024-02-26 ENCOUNTER — TELEPHONE (OUTPATIENT)
Dept: OBSTETRICS AND GYNECOLOGY | Facility: CLINIC | Age: 19
End: 2024-02-26
Payer: COMMERCIAL

## 2024-02-26 NOTE — TELEPHONE ENCOUNTER
----- Message from Levi Newberry sent at 2/26/2024  2:34 PM EST -----  Regarding: Accommodations for work  Contact: [phone]  Hi good afternoon! recently at my last visit you filled a paper out regarding work & my accommodations. They told me I’m not clear to start because it is missing an office stamp or company letterhead & was wondering if you could stamp it & fax it over to them so they could process it & i’ll be clear for my first day 03/4. I had the  pass the message on to you but unsure if you received it. Thanks!

## 2024-03-07 DIAGNOSIS — R25.2 LEG CRAMPS: Primary | ICD-10-CM

## 2024-03-07 RX ORDER — MAGNESIUM 200 MG
200 TABLET ORAL NIGHTLY
Qty: 30 TABLET | Refills: 0 | Status: SHIPPED | OUTPATIENT
Start: 2024-03-07 | End: 2024-03-25 | Stop reason: SDUPTHER

## 2024-03-11 ENCOUNTER — TELEPHONE (OUTPATIENT)
Dept: OBSTETRICS AND GYNECOLOGY | Facility: CLINIC | Age: 19
End: 2024-03-11
Payer: COMMERCIAL

## 2024-03-11 NOTE — TELEPHONE ENCOUNTER
----- Message from Levi Newberry sent at 3/6/2024  8:17 PM EST -----  Regarding: kj horses   Contact: [phone]  hi, are kj horses supposed to be common during pregnancy ?? recently the past couple nights i’ve been getting awaken out of my sleep by them and they’re very painful for me and leaves my legs very sore and painful after . Is there anything I could do to help this if possible ?

## 2024-03-11 NOTE — TELEPHONE ENCOUNTER
Discussed with SB - check to see if medication sent in.  Left message to call RN  Magnesium prescription sent in 3/7.    3/8/24 - Hi I apologize about keep messaging but today I suddenly got extreme swelling in my legs & feet and I've rested for the rest of the day trying to keep my legs elevated. It went down but not much. I'm worried should I be concerned about pre-clampsia??? - given to DALE PRADO .    3/11/24 -  left message to call RN.  rosie  3/11/024 - Levi left message, feeling better, did speak with Simon this weekend, took BP - within normal.  Has appointment coming up.

## 2024-03-21 ENCOUNTER — LAB (OUTPATIENT)
Dept: LAB | Facility: LAB | Age: 19
End: 2024-03-21
Payer: COMMERCIAL

## 2024-03-21 ENCOUNTER — ROUTINE PRENATAL (OUTPATIENT)
Dept: OBSTETRICS AND GYNECOLOGY | Facility: CLINIC | Age: 19
End: 2024-03-21
Payer: COMMERCIAL

## 2024-03-21 VITALS — DIASTOLIC BLOOD PRESSURE: 76 MMHG | BODY MASS INDEX: 26.59 KG/M2 | WEIGHT: 154.9 LBS | SYSTOLIC BLOOD PRESSURE: 111 MMHG

## 2024-03-21 DIAGNOSIS — Z67.91 RH NEGATIVE STATUS DURING PREGNANCY IN FIRST TRIMESTER (HHS-HCC): ICD-10-CM

## 2024-03-21 DIAGNOSIS — O26.891 RH NEGATIVE STATUS DURING PREGNANCY IN FIRST TRIMESTER (HHS-HCC): ICD-10-CM

## 2024-03-21 DIAGNOSIS — I47.29 NONSUSTAINED VENTRICULAR TACHYCARDIA (MULTI): ICD-10-CM

## 2024-03-21 DIAGNOSIS — Z34.03 ENCOUNTER FOR SUPERVISION OF NORMAL FIRST PREGNANCY IN THIRD TRIMESTER (HHS-HCC): ICD-10-CM

## 2024-03-21 DIAGNOSIS — Z3A.28 28 WEEKS GESTATION OF PREGNANCY (HHS-HCC): Primary | ICD-10-CM

## 2024-03-21 DIAGNOSIS — Z86.79 HISTORY OF CARDIAC ARRHYTHMIA: ICD-10-CM

## 2024-03-21 DIAGNOSIS — Z3A.28 28 WEEKS GESTATION OF PREGNANCY (HHS-HCC): ICD-10-CM

## 2024-03-21 DIAGNOSIS — F12.10 CANNABIS ABUSE, EPISODIC: ICD-10-CM

## 2024-03-21 DIAGNOSIS — F32.2 MDD (MAJOR DEPRESSIVE DISORDER), SINGLE EPISODE, SEVERE (MULTI): Chronic | ICD-10-CM

## 2024-03-21 PROBLEM — T39.1X1A TYLENOL INGESTION: Status: RESOLVED | Noted: 2023-10-04 | Resolved: 2024-03-21

## 2024-03-21 PROBLEM — R35.0 URINE FREQUENCY: Status: RESOLVED | Noted: 2023-10-04 | Resolved: 2024-03-21

## 2024-03-21 PROBLEM — Z34.93 ENCOUNTER FOR SUPERVISION OF NORMAL PREGNANCY IN THIRD TRIMESTER (HHS-HCC): Status: ACTIVE | Noted: 2024-03-21

## 2024-03-21 LAB — GLUCOSE 1H P 50 G GLC PO SERPL-MCNC: 51 MG/DL

## 2024-03-21 PROCEDURE — 99213 OFFICE O/P EST LOW 20 MIN: CPT | Performed by: ADVANCED PRACTICE MIDWIFE

## 2024-03-21 PROCEDURE — 36415 COLL VENOUS BLD VENIPUNCTURE: CPT

## 2024-03-21 PROCEDURE — 86901 BLOOD TYPING SEROLOGIC RH(D): CPT

## 2024-03-21 PROCEDURE — 86850 RBC ANTIBODY SCREEN: CPT

## 2024-03-21 PROCEDURE — 90715 TDAP VACCINE 7 YRS/> IM: CPT | Performed by: ADVANCED PRACTICE MIDWIFE

## 2024-03-21 PROCEDURE — 86900 BLOOD TYPING SEROLOGIC ABO: CPT

## 2024-03-21 PROCEDURE — 85027 COMPLETE CBC AUTOMATED: CPT

## 2024-03-21 PROCEDURE — 2500000004 HC RX 250 GENERAL PHARMACY W/ HCPCS (ALT 636 FOR OP/ED): Mod: SE | Performed by: ADVANCED PRACTICE MIDWIFE

## 2024-03-21 PROCEDURE — 82947 ASSAY GLUCOSE BLOOD QUANT: CPT

## 2024-03-21 PROCEDURE — 86780 TREPONEMA PALLIDUM: CPT

## 2024-03-21 PROCEDURE — 99213 OFFICE O/P EST LOW 20 MIN: CPT | Mod: TH | Performed by: ADVANCED PRACTICE MIDWIFE

## 2024-03-21 PROCEDURE — 96372 THER/PROPH/DIAG INJ SC/IM: CPT | Performed by: ADVANCED PRACTICE MIDWIFE

## 2024-03-21 RX ADMIN — HUMAN RHO(D) IMMUNE GLOBULIN 300 MCG: 300 INJECTION, SOLUTION INTRAMUSCULAR at 13:44

## 2024-03-21 NOTE — PROGRESS NOTES
Assessment/Plan   Problem List Items Addressed This Visit             ICD-10-CM       Ob-Gyn Problems    Encounter for supervision of normal pregnancy in third trimester Z34.93    Rh negative status during pregnancy in first trimester O26.891, Z67.91    Relevant Medications    Rho(D) immune globulin (RHOGAM) injection 300 mcg (Completed)    Other Relevant Orders    Type And Screen    Syphilis Screen with Reflex    Glucose, 1 Hour Screen, Pregnancy    CBC Anemia Panel With Reflex,Pregnancy       Other    Cannabis abuse, episodic F12.10    History of cardiac arrhythmia Z86.79    MDD (major depressive disorder), single episode, severe (CMS/HCC) (Chronic) F32.2    Nonsustained ventricular tachycardia (CMS/HCC) I47.29     Other Visit Diagnoses         Codes    28 weeks gestation of pregnancy    -  Primary Z3A.28    Relevant Medications    Rho(D) immune globulin (RHOGAM) injection 300 mcg (Completed)    Other Relevant Orders    Tdap vaccine, age 7 years and older  (BOOSTRIX) (Completed)    Type And Screen    Syphilis Screen with Reflex    Glucose, 1 Hour Screen, Pregnancy    CBC Anemia Panel With Reflex,Pregnancy          Lengthy discussion with patient regarding mental health in pregnancy- plan to reestablish with with signature and will come up with plan for PP     1hr and rhogam today   Reviewed s/sx of PTL, warning signs, fetal movement counts, and when to call provider  Follow up in 2 week for a routine prenatal visit.    EVE Baxter    Subjective     Levi Newberry is a 18 y.o.  at 28w5d with a working estimated date of delivery of 2024, by Ultrasound who presents for a routine prenatal visit. She denies vaginal bleeding, leakage of fluid, decreased fetal movements, or contractions.    Her pregnancy is complicated by:  Pregnancy Problems (from 10/05/23 to present)       Problem Noted Resolved    Rh negative status during pregnancy in first trimester 2023 by Xiao DAMIAN  EVE Kim No    Priority:  Medium      Overview Signed 2023 11:34 AM by EVE Baxter     Rhogam at 28wk         Cannabis abuse, episodic 2023 by EVE Baxter No    Priority:  Medium      Overview Signed 2023 11:53 AM by EVE Baxter     + Utox from [facility] -patient agreeable to stopping and will retest early 3rd trimester         Major depressive disorder 10/4/2023 by Kassi Jeffery No    Priority:  Medium      Overview Signed 10/5/2023  1:15 PM by Cristal Sloan MD     - taking lexapro 20mg   - follows with psych & counseling   - h/o suicide attempts via overdose, last in 2022         History of cardiac arrhythmia 10/4/2023 by EVE Baxter No    Priority:  Medium      Overview Addendum 2023 11:52 AM by EVE Baxter     Referral to cards-          8 weeks gestation of pregnancy 10/5/2023 by Cristal Sloan MD 2024 by EVE Baxter    Priority:  Medium      High risk teen pregnancy, antepartum 2023 by Dimple Nagy RN 2024 by Dimple Nagy, RN    Marijuana use during pregnancy 2023 by Dimple Nagy RN 2024 by Dimple Nagy, RN    Pregnancy, location unknown 10/5/2023 by Cristal Sloan MD 10/11/2023 by Cristal Sloan MD    Overview Addendum 10/7/2023 10:17 AM by Cristal Sloan MD     - LMP uncertain, ~6-8wks   - IUP not visualized on BSUS  - TVUS on 10/6 with gestational sac visualized, ~5wks.                   Objective   Physical Exam:   Weight: 70.3 kg (154 lb 14.4 oz)  Expected Total Weight Gain: 11.5 kg (25 lb)-16 kg (35 lb)   Pregravid BMI: 20.73  BP: 111/76 (HR 89)  Fetal Heart Rate: 137 Fundal Height (cm): 29 cm Presentation: Vertex           Postpartum Depression: High Risk (10/5/2023)    [place]  Depression Scale     Last EPDS Total Score: 13     Last EPDS Self Harm Result: Never        Prenatal Labs  Lab Results   Component  "Value Date    HGB 12.2 10/22/2023    HCT 35.4 (L) 10/22/2023    ABO O 10/05/2023    HEPBSAG Nonreactive 10/05/2023     No results found for: \"GLUF\", \"GLUT1\", \"SXXNCLR2HE\", \"MUYBTLX5MV\"  No results found for: \"GBS\"     Imaging  The most recent ultrasound was performed on   with a study GA of   and EFW of  .        "

## 2024-03-22 LAB
ABO GROUP (TYPE) IN BLOOD: NORMAL
ANTIBODY SCREEN: NORMAL
ERYTHROCYTE [DISTWIDTH] IN BLOOD BY AUTOMATED COUNT: 14.3 % (ref 11.5–14.5)
HCT VFR BLD AUTO: 36.4 % (ref 36–46)
HGB BLD-MCNC: 11.4 G/DL (ref 12–16)
MCH RBC QN AUTO: 31 PG (ref 26–34)
MCHC RBC AUTO-ENTMCNC: 31.3 G/DL (ref 32–36)
MCV RBC AUTO: 99 FL (ref 80–100)
NRBC BLD-RTO: 0 /100 WBCS (ref 0–0)
PLATELET # BLD AUTO: 198 X10*3/UL (ref 150–450)
RBC # BLD AUTO: 3.68 X10*6/UL (ref 4–5.2)
REFLEX ADDED, ANEMIA PANEL: NORMAL
RH FACTOR (ANTIGEN D): NORMAL
TREPONEMA PALLIDUM IGG+IGM AB [PRESENCE] IN SERUM OR PLASMA BY IMMUNOASSAY: NONREACTIVE
WBC # BLD AUTO: 10 X10*3/UL (ref 4.4–11.3)

## 2024-03-25 DIAGNOSIS — R51.9 PREGNANCY HEADACHE IN SECOND TRIMESTER (HHS-HCC): ICD-10-CM

## 2024-03-25 DIAGNOSIS — O21.9 NAUSEA AND VOMITING IN PREGNANCY PRIOR TO 22 WEEKS GESTATION (HHS-HCC): Primary | ICD-10-CM

## 2024-03-25 DIAGNOSIS — O26.892 PREGNANCY HEADACHE IN SECOND TRIMESTER (HHS-HCC): ICD-10-CM

## 2024-03-25 DIAGNOSIS — R25.2 LEG CRAMPS: ICD-10-CM

## 2024-03-25 RX ORDER — METOCLOPRAMIDE 10 MG/1
10 TABLET ORAL EVERY 6 HOURS PRN
Qty: 40 TABLET | Refills: 1 | Status: SHIPPED | OUTPATIENT
Start: 2024-03-25 | End: 2024-05-26 | Stop reason: HOSPADM

## 2024-03-25 RX ORDER — MAGNESIUM 200 MG
200 TABLET ORAL NIGHTLY
Qty: 30 TABLET | Refills: 2 | Status: SHIPPED | OUTPATIENT
Start: 2024-03-25 | End: 2024-05-17 | Stop reason: WASHOUT

## 2024-03-26 ENCOUNTER — OFFICE VISIT (OUTPATIENT)
Dept: PEDIATRICS | Facility: CLINIC | Age: 19
End: 2024-03-26
Payer: COMMERCIAL

## 2024-03-26 ENCOUNTER — SOCIAL WORK (OUTPATIENT)
Dept: PEDIATRICS | Facility: CLINIC | Age: 19
End: 2024-03-26

## 2024-03-26 VITALS
HEART RATE: 94 BPM | HEIGHT: 65 IN | TEMPERATURE: 97.7 F | SYSTOLIC BLOOD PRESSURE: 111 MMHG | WEIGHT: 153.44 LBS | RESPIRATION RATE: 19 BRPM | BODY MASS INDEX: 25.56 KG/M2 | DIASTOLIC BLOOD PRESSURE: 72 MMHG

## 2024-03-26 DIAGNOSIS — Z3A.29 29 WEEKS GESTATION OF PREGNANCY (HHS-HCC): ICD-10-CM

## 2024-03-26 DIAGNOSIS — Z59.41 FOOD INSECURITY: ICD-10-CM

## 2024-03-26 DIAGNOSIS — T74.31XA ADULT SUBJECT TO EMOTIONAL ABUSE, INITIAL ENCOUNTER: ICD-10-CM

## 2024-03-26 DIAGNOSIS — Z91.89 POOR DENTAL HYGIENE: ICD-10-CM

## 2024-03-26 DIAGNOSIS — Z00.01 ENCOUNTER FOR GENERAL ADULT MEDICAL EXAMINATION WITH ABNORMAL FINDINGS: Primary | ICD-10-CM

## 2024-03-26 PROCEDURE — 1036F TOBACCO NON-USER: CPT | Performed by: PEDIATRICS

## 2024-03-26 PROCEDURE — 99395 PREV VISIT EST AGE 18-39: CPT | Performed by: PEDIATRICS

## 2024-03-26 PROCEDURE — 99213 OFFICE O/P EST LOW 20 MIN: CPT | Performed by: PEDIATRICS

## 2024-03-26 SDOH — ECONOMIC STABILITY: FOOD INSECURITY: WITHIN THE PAST 12 MONTHS, THE FOOD YOU BOUGHT JUST DIDN'T LAST AND YOU DIDN'T HAVE MONEY TO GET MORE.: OFTEN TRUE

## 2024-03-26 SDOH — ECONOMIC STABILITY: FOOD INSECURITY: WITHIN THE PAST 12 MONTHS, YOU WORRIED THAT YOUR FOOD WOULD RUN OUT BEFORE YOU GOT MONEY TO BUY MORE.: OFTEN TRUE

## 2024-03-26 SDOH — ECONOMIC STABILITY - FOOD INSECURITY: FOOD INSECURITY: Z59.41

## 2024-03-26 ASSESSMENT — ENCOUNTER SYMPTOMS
ARTHRALGIAS: 0
UNEXPECTED WEIGHT CHANGE: 0
FACIAL SWELLING: 0
ABDOMINAL PAIN: 0
DIZZINESS: 1
FATIGUE: 1
NERVOUS/ANXIOUS: 0
RHINORRHEA: 1
DYSURIA: 0
SHORTNESS OF BREATH: 1
CONSTIPATION: 0
DIFFICULTY URINATING: 0
DIARRHEA: 0
LIGHT-HEADEDNESS: 1

## 2024-03-26 ASSESSMENT — PAIN SCALES - GENERAL: PAINLEVEL: 0-NO PAIN

## 2024-03-26 ASSESSMENT — VISUAL ACUITY
OS_CC: PASS
OD_CC: PASS
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
## 2024-03-26 NOTE — PROGRESS NOTES
Date Seen: 3/26/24    Medical Staff Referring: Dr. Moses    Med staff reason for referral: Counseling  X    Housing      Clothing     Food  X    Baby Needs     School     Legal   Transportation  Other    Pt: Pt is a 19 yo pregnant female.    Concerns presented by pt and family: Pt reports a desire to return to counseling. She is also seeking support for food resources.      SW assessment: SW met with pt on this day at provider's request. Family identified counseling and food as current needs.     SW discussed previous counseling. Pt would like to be re-referred to [facility]. SW will make referral. SW reviewed the Black Women's Mental Health packet and discussed self-care. Pt was also given the Food Resource packet. Pt will be referred for [facility].     Pt will also be referred to [facility]. Pt gave verbal consent.     SW assessed family for other needs. None noted. SW contact information was shared with the family for any follow up or future needs.       Follow up plan:      SW to make referral _X___  SW will check in at next pt exam ____  SW will contact family ____  Family will contact SW with any future needs __X__    Gabriela LOYD, LSW
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
## 2024-03-26 NOTE — PATIENT INSTRUCTIONS
It was so great seeing Levi Newberry in clinic today! We discussed her pregnancy related symptoms and she is doing well overall. We referred her for counseling for her mood and provided her with a referral to [facility] life to better support her nutrition. We also referred her to social work for pregnancy related support. We also provided her with a list of dentists nearby that are covered by her insurance.     As she is currently pregnant, we will transition her primary care to OB-GYN from now on.

## 2024-04-04 ENCOUNTER — ROUTINE PRENATAL (OUTPATIENT)
Dept: OBSTETRICS AND GYNECOLOGY | Facility: CLINIC | Age: 19
End: 2024-04-04
Payer: COMMERCIAL

## 2024-04-04 VITALS — SYSTOLIC BLOOD PRESSURE: 128 MMHG | WEIGHT: 161.7 LBS | BODY MASS INDEX: 26.91 KG/M2 | DIASTOLIC BLOOD PRESSURE: 80 MMHG

## 2024-04-04 DIAGNOSIS — Z67.91 RH NEGATIVE STATUS DURING PREGNANCY IN FIRST TRIMESTER (HHS-HCC): ICD-10-CM

## 2024-04-04 DIAGNOSIS — Z86.79 HISTORY OF CARDIAC ARRHYTHMIA: ICD-10-CM

## 2024-04-04 DIAGNOSIS — F12.10 CANNABIS ABUSE, EPISODIC: ICD-10-CM

## 2024-04-04 DIAGNOSIS — O26.891 RH NEGATIVE STATUS DURING PREGNANCY IN FIRST TRIMESTER (HHS-HCC): ICD-10-CM

## 2024-04-04 DIAGNOSIS — O26.843 UTERINE SIZE DATE DISCREPANCY PREGNANCY, THIRD TRIMESTER (HHS-HCC): Primary | ICD-10-CM

## 2024-04-04 PROCEDURE — 99214 OFFICE O/P EST MOD 30 MIN: CPT | Mod: GC,TH

## 2024-04-04 PROCEDURE — 99214 OFFICE O/P EST MOD 30 MIN: CPT

## 2024-04-04 NOTE — PROGRESS NOTES
Ob Follow-up  24     SUBJECTIVE      HPI: Levi Newberry is a 18 y.o.  at 30w5d here for RPNV.    She denies contractions, LOF, vaginal bleeding. She reports normal FM. Patient previously c/o palpitations, has not made cardiology referral yet. Symptoms now resolved. Planning to schedule appointment in next 2 weeks. Mood good. Endorsing lower back pain.     Pregnancy Problems (from 10/05/23 to present)       Problem Noted Resolved    Encounter for supervision of normal pregnancy in third trimester 3/21/2024 by EVE Baxter No    Priority:  Medium      Overview Addendum 2024 10:11 AM by Cristal Sloan MD     Desired provider in labor: [x] CNM  [] Physician  Dating: JASMEET  by 6wk US   [x] Initial BMI: 20.73  [x] Prenatal Labs:   [x] Rh status: O neg  [x] Genetic Screening:    [x] Baby ASA  [x] Dating confirmation with US    [x] Anatomy US:  [] Patient added to BirthTracks  [x] 1hr GCT at 24-28wks: 51  [] 3 hr GTT (if indicated):  [x] Rhogam (if indicated): 3/21  [] Fetal Surveillance (if indicated):    [x] Tdap (27-36wks): 3/21/2024  [] RSV (32-36wks)  [] Flu Shot:  [] COVID vaccine:     [x] Breastfeeding: plans to breastfeed, breast pump Rx'd  [] Pain management during labor:   [x] Postpartum Birth control method: depo, [employer] resources given    [] GBS at 36 wks:  [] 39 weeks discussion of IOL vs. Expectant management:  [] Mode of delivery:            Rh negative status during pregnancy in first trimester 2023 by EVE Baxter No    Priority:  Medium      Overview Addendum 2024 10:12 AM by Cristal Sloan MD     S/p rhogam 3/21/2024         Cannabis abuse, episodic 2023 by EVE Baxter No    Priority:  Medium      Overview Signed 2023 11:53 AM by Xiao Kim GRACE-DEREK     + Utox from [facility] -patient agreeable to stopping and will retest early 3rd trimester         Major depressive disorder 10/4/2023 by Kassi  Superable No    Priority:  Medium      Overview Signed 10/5/2023  1:15 PM by Cristal Sloan MD     - taking lexapro 20mg   - follows with psych & counseling   - h/o suicide attempts via overdose, last in 2022         History of cardiac arrhythmia 10/4/2023 by EVE Baxter No    Priority:  Medium      Overview Addendum 2023 11:52 AM by EVE Baxter     Referral to cards-          Pregnancy, location unknown 10/5/2023 by Cristal Sloan MD 10/11/2023 by Cristal Sloan MD    Priority:  Medium      Overview Addendum 10/7/2023 10:17 AM by Cristal Sloan MD     - LMP uncertain, ~6-8wks   - IUP not visualized on BSUS  - TVUS on 10/6 with gestational sac visualized, ~5wks.          8 weeks gestation of pregnancy 10/5/2023 by Cristal Sloan MD 2024 by EVE Baxter    Priority:  Medium      High risk teen pregnancy, antepartum 2023 by Dimple Nagy RN 2024 by Dimple Nagy RN    Marijuana use during pregnancy 2023 by Dimple Nagy RN 2024 by Dimple Nagy RN             OBJECTIVE  Visit Vitals  /80 Comment:    Wt 73.3 kg (161 lb 11.2 oz)   LMP 2023 (Approximate)   BMI 26.91 kg/m²   OB Status Pregnant   Smoking Status Never   BSA 1.83 m²        Fundal Height 26    ASSESSMENT & PLAN    Levi Newberry is a 18 y.o.  at 30w5d here for the following concerns we addressed today:    History of cardiac arrhythmia  - referral to cardiology given previously, pt planning to schedule appointment  - asymptomatic today     Encounter for supervision of normal pregnancy in third trimester  - PNLs reviewed, up to date  - discussed benefits of breastfeeding and resources available. Breast pump Rx faxed   - discussed PPBC and benefits of pregnancy spacing. Interested in depo, c/o irregular bleeding while on it. Resources given for [employer]   - established with [facility],  at visit today. Developing  birth plan together.   - pregnancy belt Rx'd   - S<D on exam today. Growth US ordered.       MDD (major depressive disorder), single episode, severe (CMS/HCC)  - currently on lexapro, mood good and coping well        Orders Placed This Encounter   Procedures    US MAC OB imaging order        RTC in 2 weeks, growth US next available    Seen and d/w Dr. Gabrielle Sloan MD

## 2024-04-04 NOTE — ASSESSMENT & PLAN NOTE
>>ASSESSMENT AND PLAN FOR HISTORY OF CARDIAC ARRHYTHMIA WRITTEN ON 4/4/2024 10:17 AM BY SAL LUA MD    - referral to cardiology given previously, pt planning to schedule appointment  - asymptomatic today

## 2024-04-04 NOTE — ASSESSMENT & PLAN NOTE
- referral to cardiology given previously, pt planning to schedule appointment  - asymptomatic today

## 2024-04-04 NOTE — PROGRESS NOTES
I saw and evaluated the patient. I personally obtained the key and critical portions of the history and physical exam or was physically present for key and critical portions performed by the resident/fellow. I reviewed the resident/fellow's documentation and discussed the patient with the resident/fellow. I agree with the resident/fellow's medical decision making as documented in the note.    Pam Anthony MD

## 2024-04-04 NOTE — ASSESSMENT & PLAN NOTE
- PNLs reviewed, up to date  - discussed benefits of breastfeeding and resources available. Breast pump Rx faxed   - discussed PPBC and benefits of pregnancy spacing. Interested in depo, c/o irregular bleeding while on it. Resources given for [employer]   - established with [facility] at visit today. Developing birth plan together.   - pregnancy belt Rx'd   - S<D on exam today. Growth US ordered.

## 2024-04-04 NOTE — ASSESSMENT & PLAN NOTE
>>ASSESSMENT AND PLAN FOR MDD (MAJOR DEPRESSIVE DISORDER), SINGLE EPISODE, SEVERE (MULTI) WRITTEN ON 4/4/2024 10:16 AM BY SAL LUA MD    - currently on lexapro, mood good and coping well

## 2024-04-11 ENCOUNTER — APPOINTMENT (OUTPATIENT)
Dept: RADIOLOGY | Facility: CLINIC | Age: 19
End: 2024-04-11
Payer: COMMERCIAL

## 2024-04-12 ENCOUNTER — HOSPITAL ENCOUNTER (OUTPATIENT)
Dept: RADIOLOGY | Facility: CLINIC | Age: 19
Discharge: HOME | End: 2024-04-12
Payer: COMMERCIAL

## 2024-04-12 DIAGNOSIS — O35.EXX0 PYELECTASIS OF FETUS ON PRENATAL ULTRASOUND (HHS-HCC): ICD-10-CM

## 2024-04-12 DIAGNOSIS — O26.843 UTERINE SIZE DATE DISCREPANCY PREGNANCY, THIRD TRIMESTER (HHS-HCC): ICD-10-CM

## 2024-04-12 PROCEDURE — 76819 FETAL BIOPHYS PROFIL W/O NST: CPT

## 2024-04-12 PROCEDURE — 76819 FETAL BIOPHYS PROFIL W/O NST: CPT | Performed by: OBSTETRICS & GYNECOLOGY

## 2024-04-12 PROCEDURE — 76816 OB US FOLLOW-UP PER FETUS: CPT

## 2024-04-12 PROCEDURE — 76816 OB US FOLLOW-UP PER FETUS: CPT | Performed by: OBSTETRICS & GYNECOLOGY

## 2024-04-17 DIAGNOSIS — Z3A.08 8 WEEKS GESTATION OF PREGNANCY (HHS-HCC): ICD-10-CM

## 2024-04-17 RX ORDER — B-COMPLEX WITH VITAMIN C
1 TABLET ORAL DAILY
Qty: 30 TABLET | Refills: 3 | Status: SHIPPED | OUTPATIENT
Start: 2024-04-17

## 2024-04-18 ENCOUNTER — ROUTINE PRENATAL (OUTPATIENT)
Dept: OBSTETRICS AND GYNECOLOGY | Facility: CLINIC | Age: 19
End: 2024-04-18
Payer: COMMERCIAL

## 2024-04-18 VITALS
WEIGHT: 163 LBS | HEART RATE: 86 BPM | SYSTOLIC BLOOD PRESSURE: 120 MMHG | DIASTOLIC BLOOD PRESSURE: 75 MMHG | BODY MASS INDEX: 27.12 KG/M2

## 2024-04-18 DIAGNOSIS — O09.90 HIGH RISK PREGNANCY CASE MANAGEMENT PATIENT (HHS-HCC): Primary | ICD-10-CM

## 2024-04-18 DIAGNOSIS — Z34.03 ENCOUNTER FOR SUPERVISION OF NORMAL FIRST PREGNANCY IN THIRD TRIMESTER (HHS-HCC): ICD-10-CM

## 2024-04-18 DIAGNOSIS — R00.2 PALPITATIONS: ICD-10-CM

## 2024-04-18 DIAGNOSIS — O35.EXX0 PYELECTASIS OF FETUS ON PRENATAL ULTRASOUND (HHS-HCC): ICD-10-CM

## 2024-04-18 DIAGNOSIS — F33.9 EPISODE OF RECURRENT MAJOR DEPRESSIVE DISORDER, UNSPECIFIED DEPRESSION EPISODE SEVERITY (CMS-HCC): ICD-10-CM

## 2024-04-18 DIAGNOSIS — Z86.79 HISTORY OF CARDIAC ARRHYTHMIA: ICD-10-CM

## 2024-04-18 PROCEDURE — 99214 OFFICE O/P EST MOD 30 MIN: CPT

## 2024-04-18 PROCEDURE — 99214 OFFICE O/P EST MOD 30 MIN: CPT | Mod: GC,TH

## 2024-04-18 ASSESSMENT — PAIN SCALES - GENERAL: PAINLEVEL_OUTOF10: 0 - NO PAIN

## 2024-04-18 ASSESSMENT — PAIN - FUNCTIONAL ASSESSMENT: PAIN_FUNCTIONAL_ASSESSMENT: 0-10

## 2024-04-18 NOTE — ASSESSMENT & PLAN NOTE
- no further palpitations  - shortness of breath symptoms, suspect likely 2/2 pregnancy given description however cannot rule out cardiac etiology  - reiterated importance of scheduling cardiology visit, pt will call and schedule today    no cough, and no shortness of breath.

## 2024-04-18 NOTE — PROGRESS NOTES
Ob Follow-up  24     SUBJECTIVE      HPI: Levi Newberry is a 18 y.o.  at 32w5d here for RPNV. Reports [place] contractions about 3-4 times per day lasting for a couple minutes, bleeding, or LOF. Reports normal fetal movement.     Last night, had strong intermittent abdominal pains lasting the whole day, describes as dull and sharp. Later on in the night a little bit of nausea that went away. No vomiting. No changes in bowel, no blood in the urine or stools. No fever or chills.  Reports pain has resolved, no issues today.     Endorsing shortness of breath and fatigue, though denies further palpitations.   Episodes maybe twice daily of difficulty catching her breath, also difficulty after standing. No worsening LE edema. Has not recently had any pre syncope or syncope. Planning to schedule cardiology after appointment today.     Still planning to have depot injections for postpartum contraception.     Does not want flu or COVID-19 vaccinations today.     Pregnancy Problems (from 10/05/23 to present)       Problem Noted Resolved    Encounter for supervision of normal pregnancy in third trimester ([facility]) 3/21/2024 by Xiao Kim, GRACE-DEREK No    Priority:  Medium      Overview Addendum 2024 10:11 AM by Cristal Sloan MD     Desired provider in labor: [x] CNM  [] Physician  Dating: JASMEET  by 6wk US   [x] Initial BMI: 20.73  [x] Prenatal Labs:   [x] Rh status: O neg  [x] Genetic Screening:    [x] Baby ASA  [x] Dating confirmation with US    [x] Anatomy US:  [] Patient added to BirthTracks  [x] 1hr GCT at 24-28wks: 51  [] 3 hr GTT (if indicated):  [x] Rhogam (if indicated): 3/21  [] Fetal Surveillance (if indicated):    [x] Tdap (27-36wks): 3/21/2024  [] RSV (32-36wks)  [] Flu Shot:  [] COVID vaccine:     [x] Breastfeeding: plans to breastfeed, breast pump Rx'd  [] Pain management during labor:   [x] Postpartum Birth control method: depo, [employer] resources given    [] GBS at  36 wks:  [] 39 weeks discussion of IOL vs. Expectant management:  [] Mode of delivery:            Rh negative status during pregnancy in first trimester ([facility]) 2023 by EVE Baxter No    Priority:  Medium      Overview Addendum 2024 10:12 AM by Cristal Sloan MD     S/p rhogam 3/21/2024         Cannabis abuse, episodic 2023 by EVE Baxter No    Priority:  Medium      Overview Signed 2023 11:53 AM by EVE Baxter     + Utox from [facility] -patient agreeable to stopping and will retest early 3rd trimester         Major depressive disorder 10/4/2023 by Kassi Jeffery No    Priority:  Medium      Overview Signed 10/5/2023  1:15 PM by Cristal Sloan MD     - taking lexapro 20mg   - follows with psych & counseling   - h/o suicide attempts via overdose, last in 2022         History of cardiac arrhythmia 10/4/2023 by EVE Baxter No    Priority:  Medium      Overview Addendum 2023 11:52 AM by EVE Baxter     Referral to cards-          Marijuana use during pregnancy ([facility]) 2023 by Dimple Nagy, ARNULFO 2024 by Dimple Nagy RN            OBJECTIVE  Visit Vitals  /75   Pulse 86   Wt 73.9 kg (163 lb)   LMP 2023 (Approximate)   BMI 27.12 kg/m²   OB Status Pregnant   Smoking Status Never   BSA 1.84 m²      SVE /-3    ASSESSMENT & PLAN    Levi Newberry is a 18 y.o.  at 32w5d here for the following concerns we addressed today:    Encounter for supervision of normal pregnancy in third trimester ([facility])  - SVE 1cm in office today after prolonged episode of contractions yesterday. Reviewed labor precautions, ways to contact office. Pt expressed understanding   - s/p US for S<D, growth appropriate. Fetal pyelectasis seen, for repeat US in 4 wks. Discussed ultrasound results, reassurance given.   - PPBC: depo   - plans to breastfeed, having issues getting pump. Faxed  4/8.   - declines COVID/flu     History of cardiac arrhythmia  - no further palpitations  - shortness of breath symptoms, suspect likely 2/2 pregnancy given description however cannot rule out cardiac etiology  - reiterated importance of scheduling cardiology visit, pt will call and schedule today     Major depressive disorder  - mood stable today     RTC in 2 weeks    Seen and d/w Dr. Manolo Sloan MD

## 2024-04-18 NOTE — ASSESSMENT & PLAN NOTE
- SVE 1cm in office today after prolonged episode of contractions yesterday. Reviewed labor precautions, ways to contact office. Pt expressed understanding   - s/p US for S<D, growth appropriate. Fetal pyelectasis seen, for repeat US in 4 wks. Discussed ultrasound results, reassurance given.   - PPBC: depo   - plans to breastfeed, having issues getting pump. Faxed 4/8.   - declines COVID/flu

## 2024-04-18 NOTE — ASSESSMENT & PLAN NOTE
>>ASSESSMENT AND PLAN FOR HISTORY OF CARDIAC ARRHYTHMIA WRITTEN ON 4/18/2024 11:59 AM BY SAL LUA MD    - no further palpitations  - shortness of breath symptoms, suspect likely 2/2 pregnancy given description however cannot rule out cardiac etiology  - reiterated importance of scheduling cardiology visit, pt will call and schedule today

## 2024-04-24 PROBLEM — R10.9 ABDOMINAL PAIN: Status: ACTIVE | Noted: 2023-10-22

## 2024-05-02 ENCOUNTER — ROUTINE PRENATAL (OUTPATIENT)
Dept: OBSTETRICS AND GYNECOLOGY | Facility: CLINIC | Age: 19
End: 2024-05-02
Payer: COMMERCIAL

## 2024-05-02 VITALS — BODY MASS INDEX: 27.91 KG/M2 | SYSTOLIC BLOOD PRESSURE: 106 MMHG | DIASTOLIC BLOOD PRESSURE: 71 MMHG | WEIGHT: 167.7 LBS

## 2024-05-02 DIAGNOSIS — F33.9 EPISODE OF RECURRENT MAJOR DEPRESSIVE DISORDER, UNSPECIFIED DEPRESSION EPISODE SEVERITY (CMS-HCC): Chronic | ICD-10-CM

## 2024-05-02 DIAGNOSIS — O35.EXX0 PYELECTASIS OF FETUS ON PRENATAL ULTRASOUND (HHS-HCC): ICD-10-CM

## 2024-05-02 DIAGNOSIS — F12.10 CANNABIS ABUSE, EPISODIC: ICD-10-CM

## 2024-05-02 DIAGNOSIS — Z34.03 ENCOUNTER FOR SUPERVISION OF NORMAL FIRST PREGNANCY IN THIRD TRIMESTER (HHS-HCC): Primary | ICD-10-CM

## 2024-05-02 DIAGNOSIS — I47.29 NONSUSTAINED VENTRICULAR TACHYCARDIA (MULTI): ICD-10-CM

## 2024-05-02 PROBLEM — F32.9 MAJOR DEPRESSIVE DISORDER: Chronic | Status: ACTIVE | Noted: 2022-02-25

## 2024-05-02 PROBLEM — Z91.51 HISTORY OF SUICIDAL BEHAVIOR: Status: RESOLVED | Noted: 2022-11-30 | Resolved: 2024-05-02

## 2024-05-02 PROBLEM — R10.9 ABDOMINAL PAIN: Status: RESOLVED | Noted: 2023-10-22 | Resolved: 2024-05-02

## 2024-05-02 LAB
AMPHETAMINES UR QL SCN: NORMAL
BARBITURATES UR QL SCN: NORMAL
BENZODIAZ UR QL SCN: NORMAL
BZE UR QL SCN: NORMAL
CANNABINOIDS UR QL SCN: NORMAL
FENTANYL+NORFENTANYL UR QL SCN: NORMAL
METHADONE UR QL SCN: NORMAL
OPIATES UR QL SCN: NORMAL
OXYCODONE+OXYMORPHONE UR QL SCN: NORMAL
PCP UR QL SCN: NORMAL

## 2024-05-02 PROCEDURE — 87661 TRICHOMONAS VAGINALIS AMPLIF: CPT | Mod: 59 | Performed by: STUDENT IN AN ORGANIZED HEALTH CARE EDUCATION/TRAINING PROGRAM

## 2024-05-02 PROCEDURE — 80307 DRUG TEST PRSMV CHEM ANLYZR: CPT | Performed by: STUDENT IN AN ORGANIZED HEALTH CARE EDUCATION/TRAINING PROGRAM

## 2024-05-02 PROCEDURE — 99214 OFFICE O/P EST MOD 30 MIN: CPT | Mod: GC,TH | Performed by: STUDENT IN AN ORGANIZED HEALTH CARE EDUCATION/TRAINING PROGRAM

## 2024-05-02 PROCEDURE — 99214 OFFICE O/P EST MOD 30 MIN: CPT | Performed by: STUDENT IN AN ORGANIZED HEALTH CARE EDUCATION/TRAINING PROGRAM

## 2024-05-02 PROCEDURE — 87800 DETECT AGNT MULT DNA DIREC: CPT | Performed by: STUDENT IN AN ORGANIZED HEALTH CARE EDUCATION/TRAINING PROGRAM

## 2024-05-02 ASSESSMENT — ENCOUNTER SYMPTOMS
EYES NEGATIVE: 0
CONSTITUTIONAL NEGATIVE: 0
MUSCULOSKELETAL NEGATIVE: 0
CARDIOVASCULAR NEGATIVE: 0
NEUROLOGICAL NEGATIVE: 0
PSYCHIATRIC NEGATIVE: 0
HEMATOLOGIC/LYMPHATIC NEGATIVE: 0
ALLERGIC/IMMUNOLOGIC NEGATIVE: 0
ENDOCRINE NEGATIVE: 0
GASTROINTESTINAL NEGATIVE: 0
RESPIRATORY NEGATIVE: 0

## 2024-05-02 NOTE — PROGRESS NOTES
I reviewed the resident/fellow's documentation and discussed the patient with the resident/fellow. I agree with the resident/fellow's medical decision making as documented in the note.     Umair Kurtz MD

## 2024-05-02 NOTE — ASSESSMENT & PLAN NOTE
Symptoms of SOB and LE edema could be 2/2 pregnancy, exam benign, but cannot rule out cardiac etiology. No palpitations or CP which is reassuring. Has not yet seen cardiology, discussed importance of following up. Will call and schedule today. Plan to have RN call pt in 1-2 days to confirm scheduled with cards

## 2024-05-02 NOTE — PROGRESS NOTES
OB FUV  24     SUBJECTIVE    HPI: Levi Newberry is a 18 y.o.  at 34w5d here for RPNV. She has daily contractions, lasting a couple min but resolving on their own. No VB, LOF. Good FM. Some SOB resolves with position change, mild LE edema self resolves not at particular times of day. No CP, palpitations. No urinary or GI sx, does want STI urine testing and urinalysis sent. Has not been using marijuana recently, open to Utox today based on prior plan. Mood good, seeing therapist, no SI/HI.    OBJECTIVE  Visit Vitals  /71 Comment: Pluse-79   Wt 76.1 kg (167 lb 11.2 oz)   LMP 2023 (Approximate)   BMI 27.91 kg/m²   OB Status Pregnant   Smoking Status Never   BSA 1.87 m²      FHT: 134  Fundal height: 34  Heart sounds RRR, lungs CTAB    ASSESSMENT & PLAN    Levi Newberry is a 18 y.o.  at 34w5d here for the following concerns we addressed today:    Problem List Items Addressed This Visit       Major depressive disorder (Chronic)    Overview     - Rx'd lexapro, not taking  - Follows with therapist   - Hx suicide attempts via overdose, last in 2022         Current Assessment & Plan     Not taking lexapro. Follows with therapist, doing well overall         Nonsustained ventricular tachycardia (Multi)    Overview     - Nonsustained VT identified during hospitalization 2022 in s/o acetaminophen overdose   - Neg Holter monitor workup, neg echo, cardiac MRI  - Referred to cards         Current Assessment & Plan     Symptoms of SOB and LE edema could be 2/2 pregnancy, exam benign, but cannot rule out cardiac etiology. No palpitations or CP which is reassuring. Has not yet seen cardiology, discussed importance of following up. Will call and schedule today. Plan to have RN call pt in 1-2 days to confirm scheduled with cards           Cannabis abuse, episodic    Overview     Pos Utox from [facility], stopped using, repeat sent          Current Assessment & Plan     Pt not currently  using, amenable to Utox, sent today         Encounter for supervision of normal pregnancy in third trimester ([facility]) - Primary    Overview     Desired provider in labor: [x] CNM  [] Physician  [x] Dating: by 6w US  [x] Initial BMI: 20.73  [x] Prenatal Labs  [x] Rh status: O neg  [x] Genetic Screening  [x] Baby ASA    [x] Anatomy US  [x] 1hr GCT at 24-28wks: 51  [x] 3 hr GTT (if indicated): NA  [x] Rhogam (if indicated): 3/21  [] Fetal Surveillance (if indicated):    [x] Tdap (27-36wks): 3/21/2024  [] RSV (32-36wks):  [x] Flu Shot: declines  [x] COVID vaccine: declines    [x] Breastfeeding: plans to breastfeed, breast pump rx'd  [] Pain management during labor:  [x] Postpartum Birth control method: depo    [] GBS at 36 wks:  [] 39 weeks discussion of IOL vs. Expectant management:  [] Mode of delivery:          Current Assessment & Plan     UTD on care. GC/CT/trich and UA today per pt request, POCT UA unremarkable. Will follow up if any positive results         Relevant Orders    Drug Screen, Urine With Reflex to Confirmation    C. Trachomatis / N. Gonorrhoeae, Amplified Detection    Trichomonas vaginalis, Nucleic Acid Detection    Follow Up In Obstetrics    Pyelectasis of fetus on prenatal ultrasound ([facility])    Overview     On 4/12 US, scheduled for repeat 5/10            RTC in 2 weeks. D/w Dr. Jerardo Ayala MD  PGY-3, Obstetrics and Gynecology

## 2024-05-08 DIAGNOSIS — I47.29 NONSUSTAINED VENTRICULAR TACHYCARDIA (MULTI): Primary | ICD-10-CM

## 2024-05-10 ENCOUNTER — HOSPITAL ENCOUNTER (OUTPATIENT)
Dept: RADIOLOGY | Facility: CLINIC | Age: 19
Discharge: HOME | End: 2024-05-10
Payer: COMMERCIAL

## 2024-05-10 DIAGNOSIS — O26.843 UTERINE SIZE DATE DISCREPANCY PREGNANCY, THIRD TRIMESTER (HHS-HCC): ICD-10-CM

## 2024-05-10 DIAGNOSIS — O35.EXX0 PYELECTASIS OF FETUS ON PRENATAL ULTRASOUND (HHS-HCC): ICD-10-CM

## 2024-05-10 PROCEDURE — 76819 FETAL BIOPHYS PROFIL W/O NST: CPT | Performed by: OBSTETRICS & GYNECOLOGY

## 2024-05-10 PROCEDURE — 76819 FETAL BIOPHYS PROFIL W/O NST: CPT

## 2024-05-10 PROCEDURE — 76816 OB US FOLLOW-UP PER FETUS: CPT | Performed by: OBSTETRICS & GYNECOLOGY

## 2024-05-10 PROCEDURE — 76816 OB US FOLLOW-UP PER FETUS: CPT

## 2024-05-12 NOTE — PROGRESS NOTES
Subjective     Levi Newberry is a 18 y.o.  at 36w1d with a working estimated date of delivery of 2024, by Ultrasound who presents for a routine prenatal visit.     Reports intermittent cramping over last few weeks, worsening in last few days. She denies vaginal bleeding, leakage of fluid, decreased fetal movements, or contractions.    Objective   Physical Exam:   Weight: 78.5 kg (173 lb)  TW.7 kg (52 lb 3.2 oz)  Expected Total Weight Gain: 11.5 kg (25 lb)-16 kg (35 lb)   Pregravid BMI: 20.10  BP: 114/72  Fetal Heart Rate: 135 Fundal Height (cm): 36 cm Presentation: Vertex  Dilation: 3 Effacement (%): 60 Fetal Station: -3    Postpartum Depression: High Risk (2023)    Received from [facility]    Harmony  Depression Scale     Last EPDS Total Score: 21     Last EPDS Self Harm Result: Never        Prenatal Labs  Lab Results   Component Value Date    HGB 11.4 (L) 2024    HCT 36.4 2024     2024    ABO O 2024    LABRH NEG 2024    NEISSGONOAMP Negative 2024    CHLAMTRACAMP Negative 2024    SYPHT Nonreactive 2024    HEPBSAG Nonreactive 10/05/2023    HIV1X2 Nonreactive 10/22/2023    URINECULTURE No significant growth 2024     Lab Results   Component Value Date    GLUC1P 51 2024     Imaging  The most recent ultrasound was performed on 5/10/24 with a study GA of 35.6 and EFW 13%, AC 23%.     Assessment/Plan   Problem List Items Addressed This Visit          Ob-Gyn Problems    Encounter for supervision of normal pregnancy in third trimester ([facility]) - Primary    Current Assessment & Plan     Reviewed growth US , EFW 13%, AC 23%  Coping mechanisms and pain management options during labor discussed, planning NCB, estrella roca  Discussed routine GBS screening, to be completed today  Reviewed s/sx of PTL, warning signs, fetal movement counts, and when to call provider  Follow up in 1 week for a routine prenatal visit.          Relevant Orders    Group B Streptococcus (GBS) Prenatal Screen, Culture    Pyelectasis of fetus on prenatal ultrasound ([facility])    Overview     Diagnosed on 4/12 US  5/10 Findings c/w refux or UVJ obstruction            Other    Major depressive disorder (Chronic)    Overview     - Rx'd lexapro, not taking  - Follows with therapist   - Hx suicide attempts via overdose, last in 11/2022         Cannabis abuse, episodic    Overview     Pos Utox from [facility], stopped using  Tox screen 5/2 negative          Other Visit Diagnoses       36 weeks gestation of pregnancy ([facility])              Nanci Matamoros, APRN-CNM, APRN-CNP

## 2024-05-15 ENCOUNTER — TELEPHONE (OUTPATIENT)
Dept: OBSTETRICS AND GYNECOLOGY | Facility: CLINIC | Age: 19
End: 2024-05-15
Payer: COMMERCIAL

## 2024-05-15 NOTE — TELEPHONE ENCOUNTER
----- Message from Batsheva Ayala MD sent at 5/2/2024  8:59 AM EDT -----  Hi can someone call this patient in a couple of days and make sure she has a visit with cards scheduled? Thanks!    5/6/24 - no show for 4/25 Cardiology visit.  Message to Dr SHABAZZ to put in new referral.  eb  5/8/24 -  new order in.  5/15/24 -  Not scheduled yet.  Called patient, given # to call to schedule .  Has appointment in 2 days for obfu. Message on EPIC sticky note to check to see if scheduled.  eb

## 2024-05-17 ENCOUNTER — ROUTINE PRENATAL (OUTPATIENT)
Dept: OBSTETRICS AND GYNECOLOGY | Facility: CLINIC | Age: 19
End: 2024-05-17
Payer: COMMERCIAL

## 2024-05-17 VITALS
DIASTOLIC BLOOD PRESSURE: 72 MMHG | HEART RATE: 94 BPM | WEIGHT: 173 LBS | SYSTOLIC BLOOD PRESSURE: 114 MMHG | BODY MASS INDEX: 28.79 KG/M2

## 2024-05-17 DIAGNOSIS — Z3A.36 36 WEEKS GESTATION OF PREGNANCY (HHS-HCC): ICD-10-CM

## 2024-05-17 DIAGNOSIS — O99.340 DEPRESSION AFFECTING PREGNANCY (HHS-HCC): ICD-10-CM

## 2024-05-17 DIAGNOSIS — O35.EXX0 PYELECTASIS OF FETUS ON PRENATAL ULTRASOUND (HHS-HCC): ICD-10-CM

## 2024-05-17 DIAGNOSIS — F32.A DEPRESSION AFFECTING PREGNANCY (HHS-HCC): ICD-10-CM

## 2024-05-17 DIAGNOSIS — Z34.03 ENCOUNTER FOR SUPERVISION OF NORMAL FIRST PREGNANCY IN THIRD TRIMESTER (HHS-HCC): Primary | ICD-10-CM

## 2024-05-17 DIAGNOSIS — F12.10 CANNABIS ABUSE, EPISODIC: ICD-10-CM

## 2024-05-17 PROCEDURE — 99213 OFFICE O/P EST LOW 20 MIN: CPT | Mod: TH | Performed by: NURSE PRACTITIONER

## 2024-05-17 PROCEDURE — 99213 OFFICE O/P EST LOW 20 MIN: CPT | Performed by: NURSE PRACTITIONER

## 2024-05-17 PROCEDURE — 87081 CULTURE SCREEN ONLY: CPT | Performed by: NURSE PRACTITIONER

## 2024-05-17 ASSESSMENT — PAIN - FUNCTIONAL ASSESSMENT: PAIN_FUNCTIONAL_ASSESSMENT: 0-10

## 2024-05-17 ASSESSMENT — PAIN SCALES - GENERAL: PAINLEVEL_OUTOF10: 0 - NO PAIN

## 2024-05-17 NOTE — ASSESSMENT & PLAN NOTE
Reviewed growth US , EFW 13%, AC 23%  Coping mechanisms and pain management options during labor discussed, planning NCB, has abelino  Discussed routine GBS screening, to be completed today  Reviewed s/sx of PTL, warning signs, fetal movement counts, and when to call provider  Follow up in 1 week for a routine prenatal visit.

## 2024-05-19 LAB — GP B STREP GENITAL QL CULT: ABNORMAL

## 2024-05-20 PROBLEM — B95.1 POSITIVE GBS TEST: Status: ACTIVE | Noted: 2024-05-20

## 2024-05-22 ENCOUNTER — HOSPITAL ENCOUNTER (INPATIENT)
Facility: HOSPITAL | Age: 19
LOS: 4 days | Discharge: HOME | End: 2024-05-26
Attending: STUDENT IN AN ORGANIZED HEALTH CARE EDUCATION/TRAINING PROGRAM | Admitting: ADVANCED PRACTICE MIDWIFE
Payer: COMMERCIAL

## 2024-05-22 PROBLEM — Z37.9 NORMAL LABOR (HHS-HCC): Status: ACTIVE | Noted: 2024-05-22

## 2024-05-22 LAB
ABO GROUP (TYPE) IN BLOOD: NORMAL
ANTIBODY SCREEN: NORMAL
ERYTHROCYTE [DISTWIDTH] IN BLOOD BY AUTOMATED COUNT: 14.5 % (ref 11.5–14.5)
HCT VFR BLD AUTO: 38.3 % (ref 36–46)
HGB BLD-MCNC: 12.4 G/DL (ref 12–16)
MCH RBC QN AUTO: 31.2 PG (ref 26–34)
MCHC RBC AUTO-ENTMCNC: 32.4 G/DL (ref 32–36)
MCV RBC AUTO: 96 FL (ref 80–100)
NRBC BLD-RTO: 0 /100 WBCS (ref 0–0)
PLATELET # BLD AUTO: 176 X10*3/UL (ref 150–450)
RBC # BLD AUTO: 3.98 X10*6/UL (ref 4–5.2)
RH FACTOR (ANTIGEN D): NORMAL
WBC # BLD AUTO: 12.2 X10*3/UL (ref 4.4–11.3)

## 2024-05-22 PROCEDURE — 59025 FETAL NON-STRESS TEST: CPT | Performed by: STUDENT IN AN ORGANIZED HEALTH CARE EDUCATION/TRAINING PROGRAM

## 2024-05-22 PROCEDURE — 2500000004 HC RX 250 GENERAL PHARMACY W/ HCPCS (ALT 636 FOR OP/ED): Performed by: ADVANCED PRACTICE MIDWIFE

## 2024-05-22 PROCEDURE — 86870 RBC ANTIBODY IDENTIFICATION: CPT

## 2024-05-22 PROCEDURE — 85027 COMPLETE CBC AUTOMATED: CPT | Performed by: ADVANCED PRACTICE MIDWIFE

## 2024-05-22 PROCEDURE — 86901 BLOOD TYPING SEROLOGIC RH(D): CPT | Performed by: ADVANCED PRACTICE MIDWIFE

## 2024-05-22 PROCEDURE — 86780 TREPONEMA PALLIDUM: CPT | Performed by: ADVANCED PRACTICE MIDWIFE

## 2024-05-22 PROCEDURE — 36415 COLL VENOUS BLD VENIPUNCTURE: CPT | Performed by: ADVANCED PRACTICE MIDWIFE

## 2024-05-22 PROCEDURE — 1120000001 HC OB PRIVATE ROOM DAILY

## 2024-05-22 PROCEDURE — 86077 PHYS BLOOD BANK SERV XMATCH: CPT | Performed by: ADVANCED PRACTICE MIDWIFE

## 2024-05-22 RX ORDER — OXYTOCIN/0.9 % SODIUM CHLORIDE 30/500 ML
60 PLASTIC BAG, INJECTION (ML) INTRAVENOUS ONCE AS NEEDED
Status: DISCONTINUED | OUTPATIENT
Start: 2024-05-22 | End: 2024-05-23 | Stop reason: HOSPADM

## 2024-05-22 RX ORDER — MISOPROSTOL 200 UG/1
800 TABLET ORAL ONCE AS NEEDED
Status: COMPLETED | OUTPATIENT
Start: 2024-05-22 | End: 2024-05-23

## 2024-05-22 RX ORDER — OXYTOCIN 10 [USP'U]/ML
10 INJECTION, SOLUTION INTRAMUSCULAR; INTRAVENOUS ONCE AS NEEDED
Status: DISCONTINUED | OUTPATIENT
Start: 2024-05-22 | End: 2024-05-23 | Stop reason: HOSPADM

## 2024-05-22 RX ORDER — SODIUM CHLORIDE, SODIUM LACTATE, POTASSIUM CHLORIDE, CALCIUM CHLORIDE 600; 310; 30; 20 MG/100ML; MG/100ML; MG/100ML; MG/100ML
125 INJECTION, SOLUTION INTRAVENOUS CONTINUOUS
Status: DISCONTINUED | OUTPATIENT
Start: 2024-05-22 | End: 2024-05-25

## 2024-05-22 RX ORDER — METHYLERGONOVINE MALEATE 0.2 MG/ML
0.2 INJECTION INTRAVENOUS ONCE AS NEEDED
Status: DISCONTINUED | OUTPATIENT
Start: 2024-05-22 | End: 2024-05-23 | Stop reason: HOSPADM

## 2024-05-22 RX ORDER — LIDOCAINE HYDROCHLORIDE 10 MG/ML
30 INJECTION INFILTRATION; PERINEURAL ONCE AS NEEDED
Status: DISCONTINUED | OUTPATIENT
Start: 2024-05-22 | End: 2024-05-23 | Stop reason: HOSPADM

## 2024-05-22 RX ORDER — CARBOPROST TROMETHAMINE 250 UG/ML
250 INJECTION, SOLUTION INTRAMUSCULAR ONCE AS NEEDED
Status: DISCONTINUED | OUTPATIENT
Start: 2024-05-22 | End: 2024-05-23 | Stop reason: HOSPADM

## 2024-05-22 RX ORDER — LOPERAMIDE HYDROCHLORIDE 2 MG/1
4 CAPSULE ORAL EVERY 2 HOUR PRN
Status: DISCONTINUED | OUTPATIENT
Start: 2024-05-22 | End: 2024-05-23 | Stop reason: HOSPADM

## 2024-05-22 RX ORDER — METOCLOPRAMIDE 10 MG/1
10 TABLET ORAL EVERY 6 HOURS PRN
Status: CANCELLED | OUTPATIENT
Start: 2024-05-22

## 2024-05-22 RX ORDER — ONDANSETRON HYDROCHLORIDE 2 MG/ML
4 INJECTION, SOLUTION INTRAVENOUS EVERY 6 HOURS PRN
Status: CANCELLED | OUTPATIENT
Start: 2024-05-22

## 2024-05-22 RX ORDER — TERBUTALINE SULFATE 1 MG/ML
0.25 INJECTION SUBCUTANEOUS ONCE AS NEEDED
Status: DISCONTINUED | OUTPATIENT
Start: 2024-05-22 | End: 2024-05-23 | Stop reason: HOSPADM

## 2024-05-22 RX ORDER — LABETALOL HYDROCHLORIDE 5 MG/ML
20 INJECTION, SOLUTION INTRAVENOUS ONCE AS NEEDED
Status: DISCONTINUED | OUTPATIENT
Start: 2024-05-22 | End: 2024-05-23 | Stop reason: HOSPADM

## 2024-05-22 RX ORDER — NIFEDIPINE 10 MG/1
10 CAPSULE ORAL ONCE AS NEEDED
Status: DISCONTINUED | OUTPATIENT
Start: 2024-05-22 | End: 2024-05-23 | Stop reason: HOSPADM

## 2024-05-22 RX ORDER — METOCLOPRAMIDE HYDROCHLORIDE 5 MG/ML
10 INJECTION INTRAMUSCULAR; INTRAVENOUS EVERY 6 HOURS PRN
Status: CANCELLED | OUTPATIENT
Start: 2024-05-22

## 2024-05-22 RX ORDER — HYDRALAZINE HYDROCHLORIDE 20 MG/ML
5 INJECTION INTRAMUSCULAR; INTRAVENOUS ONCE AS NEEDED
Status: DISCONTINUED | OUTPATIENT
Start: 2024-05-22 | End: 2024-05-23 | Stop reason: HOSPADM

## 2024-05-22 RX ORDER — PENICILLIN G 3000000 [IU]/50ML
3 INJECTION, SOLUTION INTRAVENOUS EVERY 4 HOURS
Status: DISCONTINUED | OUTPATIENT
Start: 2024-05-23 | End: 2024-05-23 | Stop reason: HOSPADM

## 2024-05-22 RX ORDER — ONDANSETRON 4 MG/1
4 TABLET, FILM COATED ORAL EVERY 6 HOURS PRN
Status: CANCELLED | OUTPATIENT
Start: 2024-05-22

## 2024-05-22 RX ORDER — TRANEXAMIC ACID 100 MG/ML
1000 INJECTION, SOLUTION INTRAVENOUS ONCE AS NEEDED
Status: DISCONTINUED | OUTPATIENT
Start: 2024-05-22 | End: 2024-05-23 | Stop reason: HOSPADM

## 2024-05-22 RX ADMIN — PENICILLIN G POTASSIUM 5 MILLION UNITS: 5000000 INJECTION, POWDER, FOR SOLUTION INTRAMUSCULAR; INTRAVENOUS at 22:02

## 2024-05-22 RX ADMIN — SODIUM CHLORIDE, POTASSIUM CHLORIDE, SODIUM LACTATE AND CALCIUM CHLORIDE 125 ML/HR: 600; 310; 30; 20 INJECTION, SOLUTION INTRAVENOUS at 22:00

## 2024-05-22 SDOH — HEALTH STABILITY: MENTAL HEALTH: SUICIDAL BEHAVIOR (LIFETIME): NO

## 2024-05-22 SDOH — HEALTH STABILITY: MENTAL HEALTH: HAVE YOU USED ANY SUBSTANCES (CANABIS, COCAINE, HEROIN, HALLUCINOGENS, INHALANTS, ETC.) IN THE PAST 12 MONTHS?: NO

## 2024-05-22 SDOH — SOCIAL STABILITY: SOCIAL INSECURITY: DO YOU FEEL ANYONE HAS EXPLOITED OR TAKEN ADVANTAGE OF YOU FINANCIALLY OR OF YOUR PERSONAL PROPERTY?: NO

## 2024-05-22 SDOH — SOCIAL STABILITY: SOCIAL INSECURITY: ABUSE SCREEN: ADULT

## 2024-05-22 SDOH — SOCIAL STABILITY: SOCIAL INSECURITY: HAVE YOU HAD ANY THOUGHTS OF HARMING ANYONE ELSE?: NO

## 2024-05-22 SDOH — SOCIAL STABILITY: SOCIAL INSECURITY: PHYSICAL ABUSE: DENIES

## 2024-05-22 SDOH — SOCIAL STABILITY: SOCIAL INSECURITY: DOES ANYONE TRY TO KEEP YOU FROM HAVING/CONTACTING OTHER FRIENDS OR DOING THINGS OUTSIDE YOUR HOME?: NO

## 2024-05-22 SDOH — SOCIAL STABILITY: SOCIAL INSECURITY: HAVE YOU HAD THOUGHTS OF HARMING ANYONE ELSE?: NO

## 2024-05-22 SDOH — ECONOMIC STABILITY: HOUSING INSECURITY: DO YOU FEEL UNSAFE GOING BACK TO THE PLACE WHERE YOU ARE LIVING?: NO

## 2024-05-22 SDOH — HEALTH STABILITY: MENTAL HEALTH: WERE YOU ABLE TO COMPLETE ALL THE BEHAVIORAL HEALTH SCREENINGS?: YES

## 2024-05-22 SDOH — SOCIAL STABILITY: SOCIAL INSECURITY: ARE YOU OR HAVE YOU BEEN THREATENED OR ABUSED PHYSICALLY, EMOTIONALLY, OR SEXUALLY BY ANYONE?: NO

## 2024-05-22 SDOH — HEALTH STABILITY: MENTAL HEALTH: SUICIDAL BEHAVIOR (LIFETIME): YES

## 2024-05-22 SDOH — SOCIAL STABILITY: SOCIAL INSECURITY: HAS ANYONE EVER THREATENED TO HURT YOUR FAMILY OR YOUR PETS?: NO

## 2024-05-22 SDOH — SOCIAL STABILITY: SOCIAL INSECURITY: VERBAL ABUSE: DENIES

## 2024-05-22 SDOH — SOCIAL STABILITY: SOCIAL INSECURITY: ARE THERE ANY APPARENT SIGNS OF INJURIES/BEHAVIORS THAT COULD BE RELATED TO ABUSE/NEGLECT?: NO

## 2024-05-22 SDOH — HEALTH STABILITY: MENTAL HEALTH: NON-SPECIFIC ACTIVE SUICIDAL THOUGHTS (PAST 1 MONTH): NO

## 2024-05-22 SDOH — HEALTH STABILITY: MENTAL HEALTH: WISH TO BE DEAD (PAST 1 MONTH): NO

## 2024-05-22 SDOH — HEALTH STABILITY: MENTAL HEALTH: HAVE YOU USED ANY PRESCRIPTION DRUGS OTHER THAN PRESCRIBED IN THE PAST 12 MONTHS?: NO

## 2024-05-22 SDOH — HEALTH STABILITY: MENTAL HEALTH

## 2024-05-22 ASSESSMENT — LIFESTYLE VARIABLES
SKIP TO QUESTIONS 9-10: 1
HOW MANY STANDARD DRINKS CONTAINING ALCOHOL DO YOU HAVE ON A TYPICAL DAY: PATIENT DOES NOT DRINK
HOW OFTEN DO YOU HAVE A DRINK CONTAINING ALCOHOL: NEVER
SKIP TO QUESTIONS 9-10: 1
AUDIT-C TOTAL SCORE: 0
HOW OFTEN DO YOU HAVE A DRINK CONTAINING ALCOHOL: NEVER
HOW OFTEN DO YOU HAVE 6 OR MORE DRINKS ON ONE OCCASION: NEVER
HOW MANY STANDARD DRINKS CONTAINING ALCOHOL DO YOU HAVE ON A TYPICAL DAY: PATIENT DOES NOT DRINK
AUDIT-C TOTAL SCORE: 0
HOW OFTEN DO YOU HAVE 6 OR MORE DRINKS ON ONE OCCASION: NEVER

## 2024-05-22 ASSESSMENT — PAIN SCALES - GENERAL
PAINLEVEL: 0 - NO PAIN
PAINLEVEL_OUTOF10: 0 - NO PAIN

## 2024-05-22 ASSESSMENT — PATIENT HEALTH QUESTIONNAIRE - PHQ9
2. FEELING DOWN, DEPRESSED OR HOPELESS: NOT AT ALL
1. LITTLE INTEREST OR PLEASURE IN DOING THINGS: NOT AT ALL
SUM OF ALL RESPONSES TO PHQ9 QUESTIONS 1 & 2: 0

## 2024-05-22 ASSESSMENT — ACTIVITIES OF DAILY LIVING (ADL): LACK_OF_TRANSPORTATION: NO

## 2024-05-22 NOTE — H&P
Obstetrical Triage History and Physical     Levi Newberry is a 18 y.o.   at 37w4d by 6w6d US who presents to triage for r/o labor.     Chief Complaint: Contractions    Assessment/Plan    R/o Labor  - SVE /-2, for 2 hour recheck   - toco: ctxs every 2-5 minutes  - likely latent labor     Maternal Well-being  - Vital signs stable and WNL  - Emotional support and reassurance provided  - All questions and concerns addressed    IUP @ 37w4d  - prenatal lab work reviewed   - fetal right pyelectasis (see [facility] Plan of Care)  - last growth 5/10 EFW 2363g, AC 23%  - normal 1 hr GTT  - Rh negative, s/p Rhogam 3/21  - Cephalic on BSUS  - NST reactive  - GBS positive, for PCN    Dispo: pending recheck at 2100    Signed out to Jennyfer Martin CNM for continuation of care     Elizabeth Rivera PA-C  24 7:40 PM  Vocera     Active Problems:  There are no active Hospital Problems.      Pregnancy Problems (from 10/05/23 to present)       Problem Noted Resolved    Positive GBS test 2024 by GRACE Thorne-CNM, APRN-CNP No    Priority:  Medium      Encounter for supervision of normal pregnancy in third trimester ([facility]) 3/21/2024 by EVE Baxter No    Priority:  Medium      Overview Addendum 2024 11:36 AM by Claudia Edwards MD     Datinw us   [x] Initial BMI: 28  [] Prenatal Labs:   [x] Genetic Screening:  rr CF SNA  [x] Baby ASA: NO  [] Anatomy US:  [x] 1hr GCT at 24-28wks: 51  [x] Tdap (27-36wks): yes, 3/21/2024   [] COVID vaccine:   [x] Rhogam (if Rh neg): S/p rhogam 3/21/2024   [x] GBS at 36 wks: pos  [] Breastfeeding  [] Postpartum Birth control method:   [] 39 weeks discussion of IOL vs. Expectant management:  [] Mode of delivery:           Rh negative status during pregnancy in first trimester ([facility]) 2023 by EVE Baxter No    Priority:  Medium      Overview Addendum 2024 10:12 AM by Cristal Sloan MD     S/p rhogam 3/21/2024          Cannabis abuse, episodic 2023 by EVE Baxter No    Priority:  Medium      Overview Addendum 2024  8:29 AM by EVE Thorne, GRACE-CNP     Pos Utox from [facility], stopped using  Tox screen  negative         Nonsustained ventricular tachycardia ([facility]) 10/4/2023 by Kassi Jeffery No    Priority:  Medium      Overview Addendum 2024  9:04 AM by Batsheva Ayala MD     - Nonsustained VT identified during hospitalization 2022 in s/o acetaminophen overdose   - Neg Holter monitor workup, neg echo, cardiac MRI  - Referred to cards         Major depressive disorder 2022 by Kassi Jeffery No    Priority:  Medium      Overview Addendum 2024  9:13 AM by Batsheva Ayala MD     - Rx'd lexapro, not taking  - Follows with therapist   - Hx suicide attempts via overdose, last in 2022         High risk teen pregnancy, antepartum ([facility]) 2023 by Dimple Nagy RN 2024 by Dimple Nagy, RN    Marijuana use during pregnancy ([facility]) 2023 by Dimple Nagy RN 2024 by Dimple Nagy, ARNULFO    Pregnancy, location unknown ([facility]) 10/5/2023 by Cristal Sloan MD 10/11/2023 by Cristal Sloan MD    Overview Addendum 10/7/2023 10:17 AM by Cristal Sloan MD     - LMP uncertain, ~6-8wks   - IUP not visualized on BSUS  - TVUS on 10/6 with gestational sac visualized, ~5wks.          8 weeks gestation of pregnancy ([facility]) 10/5/2023 by Cristal Sloan MD 2024 by EVE Baxter          Richard Sims is a 18 y.o.  at 37w4d by 6w6d US who presents to triage for r/o labor. Contractions started 2 hour ago and are every 2-4 minutes. Denies VB or LOF. Reports good FM.        Obstetrical History   OB History    Para Term  AB Living   1             SAB IAB Ectopic Multiple Live Births                  # Outcome Date GA Lbr Jim/2nd Weight Sex Delivery Anes PTL Lv   1 Current                Past Medical History  Past  Medical History:   Diagnosis Date    Abnormal urine findings 10/04/2023    Adolescent dysmenorrhea 10/04/2023    Bacterial vaginosis 10/04/2023    Body mass index (BMI) pediatric, 5th percentile to less than 85th percentile for age 03/12/2020    BMI (body mass index), pediatric, 5% to less than 85% for age    Chlamydial infection, unspecified 04/19/2022    Chlamydia infection    Dehydration 10/04/2023    Disorder of lipoprotein metabolism, 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    Elevated serum cholesterol    Dysmenorrhea, unspecified 04/30/2020    Menstrual cramps    Eczema     Encounter for contraceptive management, unspecified 07/23/2020    Contraception management    Encounter for immunization 04/20/2021    Immunization due    Encounter for immunization 08/17/2016    Immunization due    Encounter for routine child health examination with abnormal findings 03/12/2020    Encounter for routine child health examination with abnormal findings    Eye pain 01/17/2024    Comment on above: EYE PAIN    Frequency of micturition 08/22/2022    Urine frequency    GERD (gastroesophageal reflux disease)     High risk teen pregnancy, antepartum ([facility]) 12/14/2023    History of chlamydia 11/16/2023    History of gonorrhea 11/16/2023    Marijuana use during pregnancy ([facility]) 12/14/2023    Other specified abnormal findings of blood chemistry 08/19/2016    Low serum vitamin D    Overdose by acetaminophen 2022    Intentional overdose    Person with feared health complaint in whom no diagnosis is made 10/16/2019    Concern about cardiovascular disease without diagnosis    Personal history of other diseases of the female genital tract 08/21/2020    History of vaginal discharge    Personal history of other specified conditions 08/21/2020    History of urinary frequency    Pregnancy, location unknown ([facility]) 10/05/2023    - LMP uncertain, ~6-8wks   - IUP not visualized on BSUS  - TVUS on 10/6 with gestational sac visualized, ~5wks.     Pure  hypercholesterolemia, unspecified 03/12/2020    Pure hypercholesterolemia, unspecified    Rash of genitalia 04/13/2023    Recurrent major depressive disorder, in partial remission ([facility]) 03/25/2022    Severe episode of recurrent major depressive disorder, without psychotic features ([facility]) 10/04/2023    Suicide risk 02/25/2022    Vaginal odor 10/04/2023    Vulvovaginitis candida albicans 10/04/2023        Past Surgical History   No past surgical history on file.    Social History  Social History     Tobacco Use    Smoking status: Never     Passive exposure: Never    Smokeless tobacco: Never   Substance Use Topics    Alcohol use: Never     Substance and Sexual Activity   Drug Use Not Currently    Types: Marijuana    Comment: Stopped 11/2023       Allergies  Tylenol [acetaminophen]     Medications  Facility-Administered Medications Prior to Admission   Medication Dose Route Frequency Provider Last Rate Last Admin    prenatal vitamin calcium-iron-folic tablet 1 tablet  1 tablet oral Once Zaira Jacob MD         Medications Prior to Admission   Medication Sig Dispense Refill Last Dose    famotidine (Pepcid) 20 mg tablet Take 1 tablet (20 mg) by mouth 2 times a day. 60 tablet 11     metoclopramide (Reglan) 10 mg tablet Take 1 tablet (10 mg) by mouth every 6 hours if needed (nausea and vomiting - headaches) for up to 20 days. 40 tablet 1     Prenatal Vitamin 27 mg iron- 0.8 mg tablet TAKE 1 TABLET BY MOUTH EVERY DAY 30 tablet 3        Objective    Last Vitals  Temp Pulse Resp BP MAP O2 Sat   36.4 °C (97.5 °F) 95 16 135/78   99 %     Physical Examination  GENERAL: Examination reveals a well developed, well nourished, gravid female in no acute distress. She is alert and cooperative.  LUNGS:  normal respiratory effort  ABDOMEN:  soft, gravid, palpates firm with ctxs  CERVIX: 4 cm dilated, 70 % effaced, -2 station; MEMBRANES are Intact  NEUROLOGICAL: alert, oriented, normal speech, no focal findings or movement disorder  noted  PSYCHOLOGICAL: awake and alert; oriented to person, place, and time    Non-Stress Test   Baseline Fetal Heart Rate for Non-Stress Test: 135 BPM  Variability in Waveform for Non-Stress Test: Moderate  Accelerations in Non-Stress Test: Yes, lasting at least 15 seconds, greater than/equal to 10 bpm  Decelerations in Non-Stress Test: None  Contractions in Non-Stress Test: Irregular  NST Contraction Frequency: every 2-5 minutes  Interpretation of Non-Stress Test   Interpretation of Non-Stress Test: Reactive    Lab Review  Labs in chart were reviewed.

## 2024-05-23 ENCOUNTER — ANESTHESIA (OUTPATIENT)
Dept: OBSTETRICS AND GYNECOLOGY | Facility: HOSPITAL | Age: 19
End: 2024-05-23
Payer: COMMERCIAL

## 2024-05-23 ENCOUNTER — ANESTHESIA EVENT (OUTPATIENT)
Dept: OBSTETRICS AND GYNECOLOGY | Facility: HOSPITAL | Age: 19
End: 2024-05-23
Payer: COMMERCIAL

## 2024-05-23 LAB
ALBUMIN SERPL BCP-MCNC: 3.2 G/DL (ref 3.4–5)
ALP SERPL-CCNC: 112 U/L (ref 33–110)
ALT SERPL W P-5'-P-CCNC: 21 U/L (ref 7–45)
ANION GAP SERPL CALC-SCNC: 16 MMOL/L (ref 10–20)
AST SERPL W P-5'-P-CCNC: 39 U/L (ref 9–39)
BB ANTIBODY IDENTIFICATION: NORMAL
BILIRUB SERPL-MCNC: 0.5 MG/DL (ref 0–1.2)
BUN SERPL-MCNC: 7 MG/DL (ref 6–23)
CALCIUM SERPL-MCNC: 8.7 MG/DL (ref 8.6–10.6)
CASE #: NORMAL
CHLORIDE SERPL-SCNC: 106 MMOL/L (ref 98–107)
CO2 SERPL-SCNC: 20 MMOL/L (ref 21–32)
CREAT SERPL-MCNC: 0.71 MG/DL (ref 0.5–1.05)
EGFRCR SERPLBLD CKD-EPI 2021: >90 ML/MIN/1.73M*2
ERYTHROCYTE [DISTWIDTH] IN BLOOD BY AUTOMATED COUNT: 14.2 % (ref 11.5–14.5)
GLUCOSE SERPL-MCNC: 86 MG/DL (ref 74–99)
HCT VFR BLD AUTO: 34.3 % (ref 36–46)
HGB BLD-MCNC: 11.6 G/DL (ref 12–16)
MCH RBC QN AUTO: 31.2 PG (ref 26–34)
MCHC RBC AUTO-ENTMCNC: 33.8 G/DL (ref 32–36)
MCV RBC AUTO: 92 FL (ref 80–100)
NRBC BLD-RTO: 0 /100 WBCS (ref 0–0)
PATH REV-IMMUNOHEMATOLOGY-PR30: NORMAL
PLATELET # BLD AUTO: 169 X10*3/UL (ref 150–450)
POTASSIUM SERPL-SCNC: 3.8 MMOL/L (ref 3.5–5.3)
PROT SERPL-MCNC: 6.1 G/DL (ref 6.4–8.2)
RBC # BLD AUTO: 3.72 X10*6/UL (ref 4–5.2)
SODIUM SERPL-SCNC: 138 MMOL/L (ref 136–145)
TREPONEMA PALLIDUM IGG+IGM AB [PRESENCE] IN SERUM OR PLASMA BY IMMUNOASSAY: NONREACTIVE
WBC # BLD AUTO: 15.7 X10*3/UL (ref 4.4–11.3)

## 2024-05-23 PROCEDURE — 3700000014 HC AN EPIDURAL BLOCK CHARGE: Performed by: OBSTETRICS & GYNECOLOGY

## 2024-05-23 PROCEDURE — 51701 INSERT BLADDER CATHETER: CPT

## 2024-05-23 PROCEDURE — 7100000016 HC LABOR RECOVERY PER HOUR: Performed by: OBSTETRICS & GYNECOLOGY

## 2024-05-23 PROCEDURE — 85027 COMPLETE CBC AUTOMATED: CPT | Performed by: STUDENT IN AN ORGANIZED HEALTH CARE EDUCATION/TRAINING PROGRAM

## 2024-05-23 PROCEDURE — 2500000001 HC RX 250 WO HCPCS SELF ADMINISTERED DRUGS (ALT 637 FOR MEDICARE OP): Performed by: STUDENT IN AN ORGANIZED HEALTH CARE EDUCATION/TRAINING PROGRAM

## 2024-05-23 PROCEDURE — 2500000004 HC RX 250 GENERAL PHARMACY W/ HCPCS (ALT 636 FOR OP/ED): Performed by: ANESTHESIOLOGIST ASSISTANT

## 2024-05-23 PROCEDURE — 10907ZC DRAINAGE OF AMNIOTIC FLUID, THERAPEUTIC FROM PRODUCTS OF CONCEPTION, VIA NATURAL OR ARTIFICIAL OPENING: ICD-10-PCS | Performed by: ADVANCED PRACTICE MIDWIFE

## 2024-05-23 PROCEDURE — 2500000005 HC RX 250 GENERAL PHARMACY W/O HCPCS

## 2024-05-23 PROCEDURE — 3700000014 EPIDURAL BLOCK: Performed by: STUDENT IN AN ORGANIZED HEALTH CARE EDUCATION/TRAINING PROGRAM

## 2024-05-23 PROCEDURE — 2500000004 HC RX 250 GENERAL PHARMACY W/ HCPCS (ALT 636 FOR OP/ED): Performed by: STUDENT IN AN ORGANIZED HEALTH CARE EDUCATION/TRAINING PROGRAM

## 2024-05-23 PROCEDURE — 1100000001 HC PRIVATE ROOM DAILY

## 2024-05-23 PROCEDURE — 80053 COMPREHEN METABOLIC PANEL: CPT | Performed by: STUDENT IN AN ORGANIZED HEALTH CARE EDUCATION/TRAINING PROGRAM

## 2024-05-23 PROCEDURE — 2500000004 HC RX 250 GENERAL PHARMACY W/ HCPCS (ALT 636 FOR OP/ED): Performed by: ADVANCED PRACTICE MIDWIFE

## 2024-05-23 PROCEDURE — 7100000016 HC LABOR RECOVERY PER HOUR

## 2024-05-23 PROCEDURE — 59514 CESAREAN DELIVERY ONLY: CPT | Performed by: OBSTETRICS & GYNECOLOGY

## 2024-05-23 PROCEDURE — 7210000002 HC LABOR PER HOUR

## 2024-05-23 PROCEDURE — 2500000004 HC RX 250 GENERAL PHARMACY W/ HCPCS (ALT 636 FOR OP/ED): Performed by: NURSE ANESTHETIST, CERTIFIED REGISTERED

## 2024-05-23 PROCEDURE — 2500000004 HC RX 250 GENERAL PHARMACY W/ HCPCS (ALT 636 FOR OP/ED)

## 2024-05-23 PROCEDURE — 2500000006 HC RX 250 W HCPCS SELF ADMINISTERED DRUGS (ALT 637 FOR ALL PAYERS): Performed by: ADVANCED PRACTICE MIDWIFE

## 2024-05-23 PROCEDURE — 2720000007 HC OR 272 NO HCPCS: Performed by: OBSTETRICS & GYNECOLOGY

## 2024-05-23 PROCEDURE — 51702 INSERT TEMP BLADDER CATH: CPT

## 2024-05-23 RX ORDER — LABETALOL HYDROCHLORIDE 5 MG/ML
20 INJECTION, SOLUTION INTRAVENOUS ONCE AS NEEDED
Status: DISCONTINUED | OUTPATIENT
Start: 2024-05-23 | End: 2024-05-26 | Stop reason: HOSPADM

## 2024-05-23 RX ORDER — METHYLERGONOVINE MALEATE 0.2 MG/ML
INJECTION INTRAVENOUS AS NEEDED
Status: DISCONTINUED | OUTPATIENT
Start: 2024-05-23 | End: 2024-05-23

## 2024-05-23 RX ORDER — SIMETHICONE 80 MG
80 TABLET,CHEWABLE ORAL 4 TIMES DAILY PRN
Status: DISCONTINUED | OUTPATIENT
Start: 2024-05-23 | End: 2024-05-26 | Stop reason: HOSPADM

## 2024-05-23 RX ORDER — BISACODYL 10 MG/1
10 SUPPOSITORY RECTAL DAILY PRN
Status: DISCONTINUED | OUTPATIENT
Start: 2024-05-23 | End: 2024-05-26 | Stop reason: HOSPADM

## 2024-05-23 RX ORDER — LIDOCAINE 560 MG/1
1 PATCH PERCUTANEOUS; TOPICAL; TRANSDERMAL
Status: DISCONTINUED | OUTPATIENT
Start: 2024-05-23 | End: 2024-05-26 | Stop reason: HOSPADM

## 2024-05-23 RX ORDER — ONDANSETRON HYDROCHLORIDE 2 MG/ML
4 INJECTION, SOLUTION INTRAVENOUS EVERY 6 HOURS PRN
Status: DISCONTINUED | OUTPATIENT
Start: 2024-05-23 | End: 2024-05-26 | Stop reason: HOSPADM

## 2024-05-23 RX ORDER — FENTANYL CITRATE 50 UG/ML
INJECTION, SOLUTION INTRAMUSCULAR; INTRAVENOUS AS NEEDED
Status: DISCONTINUED | OUTPATIENT
Start: 2024-05-23 | End: 2024-05-23

## 2024-05-23 RX ORDER — DIPHENHYDRAMINE HYDROCHLORIDE 50 MG/ML
25 INJECTION INTRAMUSCULAR; INTRAVENOUS EVERY 4 HOURS PRN
Status: DISCONTINUED | OUTPATIENT
Start: 2024-05-23 | End: 2024-05-26 | Stop reason: HOSPADM

## 2024-05-23 RX ORDER — ONDANSETRON HYDROCHLORIDE 2 MG/ML
INJECTION, SOLUTION INTRAVENOUS AS NEEDED
Status: DISCONTINUED | OUTPATIENT
Start: 2024-05-23 | End: 2024-05-23

## 2024-05-23 RX ORDER — ENOXAPARIN SODIUM 100 MG/ML
40 INJECTION SUBCUTANEOUS EVERY 24 HOURS
Status: DISCONTINUED | OUTPATIENT
Start: 2024-05-24 | End: 2024-05-26 | Stop reason: HOSPADM

## 2024-05-23 RX ORDER — IBUPROFEN 600 MG/1
600 TABLET ORAL EVERY 6 HOURS
Status: DISCONTINUED | OUTPATIENT
Start: 2024-05-24 | End: 2024-05-26 | Stop reason: HOSPADM

## 2024-05-23 RX ORDER — KETOROLAC TROMETHAMINE 30 MG/ML
30 INJECTION, SOLUTION INTRAMUSCULAR; INTRAVENOUS EVERY 6 HOURS
Status: COMPLETED | OUTPATIENT
Start: 2024-05-23 | End: 2024-05-24

## 2024-05-23 RX ORDER — HYDROXYZINE HYDROCHLORIDE 25 MG/1
25 TABLET, FILM COATED ORAL ONCE
Status: COMPLETED | OUTPATIENT
Start: 2024-05-23 | End: 2024-05-23

## 2024-05-23 RX ORDER — LIDOCAINE HCL/EPINEPHRINE/PF 2%-1:200K
VIAL (ML) INJECTION AS NEEDED
Status: DISCONTINUED | OUTPATIENT
Start: 2024-05-23 | End: 2024-05-23

## 2024-05-23 RX ORDER — OXYTOCIN/0.9 % SODIUM CHLORIDE 30/500 ML
2-30 PLASTIC BAG, INJECTION (ML) INTRAVENOUS CONTINUOUS
Status: DISCONTINUED | OUTPATIENT
Start: 2024-05-23 | End: 2024-05-25

## 2024-05-23 RX ORDER — FENTANYL/BUPIVACAINE/NS/PF 2MCG/ML-.1
PLASTIC BAG, INJECTION (ML) INJECTION AS NEEDED
Status: DISCONTINUED | OUTPATIENT
Start: 2024-05-23 | End: 2024-05-23

## 2024-05-23 RX ORDER — METOCLOPRAMIDE HYDROCHLORIDE 5 MG/ML
INJECTION INTRAMUSCULAR; INTRAVENOUS AS NEEDED
Status: DISCONTINUED | OUTPATIENT
Start: 2024-05-23 | End: 2024-05-23

## 2024-05-23 RX ORDER — POLYETHYLENE GLYCOL 3350 17 G/17G
17 POWDER, FOR SOLUTION ORAL 2 TIMES DAILY PRN
Status: DISCONTINUED | OUTPATIENT
Start: 2024-05-23 | End: 2024-05-26 | Stop reason: HOSPADM

## 2024-05-23 RX ORDER — TRANEXAMIC ACID 100 MG/ML
1000 INJECTION, SOLUTION INTRAVENOUS ONCE AS NEEDED
Status: DISCONTINUED | OUTPATIENT
Start: 2024-05-23 | End: 2024-05-26 | Stop reason: HOSPADM

## 2024-05-23 RX ORDER — FENTANYL/BUPIVACAINE/NS/PF 2MCG/ML-.1
PLASTIC BAG, INJECTION (ML) INJECTION CONTINUOUS PRN
Status: DISCONTINUED | OUTPATIENT
Start: 2024-05-23 | End: 2024-05-23

## 2024-05-23 RX ORDER — HYDRALAZINE HYDROCHLORIDE 20 MG/ML
5 INJECTION INTRAMUSCULAR; INTRAVENOUS ONCE AS NEEDED
Status: DISCONTINUED | OUTPATIENT
Start: 2024-05-23 | End: 2024-05-26 | Stop reason: HOSPADM

## 2024-05-23 RX ORDER — MISOPROSTOL 200 UG/1
800 TABLET ORAL ONCE AS NEEDED
Status: DISCONTINUED | OUTPATIENT
Start: 2024-05-23 | End: 2024-05-26 | Stop reason: HOSPADM

## 2024-05-23 RX ORDER — OXYCODONE HYDROCHLORIDE 5 MG/1
5 TABLET ORAL EVERY 4 HOURS PRN
Status: DISCONTINUED | OUTPATIENT
Start: 2024-05-24 | End: 2024-05-26 | Stop reason: HOSPADM

## 2024-05-23 RX ORDER — AZITHROMYCIN 100 MG/ML
INJECTION, POWDER, LYOPHILIZED, FOR SOLUTION INTRAVENOUS AS NEEDED
Status: DISCONTINUED | OUTPATIENT
Start: 2024-05-23 | End: 2024-05-23

## 2024-05-23 RX ORDER — NIFEDIPINE 10 MG/1
10 CAPSULE ORAL ONCE AS NEEDED
Status: DISCONTINUED | OUTPATIENT
Start: 2024-05-23 | End: 2024-05-26 | Stop reason: HOSPADM

## 2024-05-23 RX ORDER — HYDROMORPHONE HYDROCHLORIDE 1 MG/ML
0.2 INJECTION, SOLUTION INTRAMUSCULAR; INTRAVENOUS; SUBCUTANEOUS EVERY 5 MIN PRN
Status: DISCONTINUED | OUTPATIENT
Start: 2024-05-23 | End: 2024-05-26 | Stop reason: HOSPADM

## 2024-05-23 RX ORDER — ONDANSETRON 4 MG/1
4 TABLET, FILM COATED ORAL EVERY 6 HOURS PRN
Status: DISCONTINUED | OUTPATIENT
Start: 2024-05-23 | End: 2024-05-26 | Stop reason: HOSPADM

## 2024-05-23 RX ORDER — CARBOPROST TROMETHAMINE 250 UG/ML
250 INJECTION, SOLUTION INTRAMUSCULAR ONCE AS NEEDED
Status: DISCONTINUED | OUTPATIENT
Start: 2024-05-23 | End: 2024-05-26 | Stop reason: HOSPADM

## 2024-05-23 RX ORDER — DIPHENHYDRAMINE HCL 25 MG
25 CAPSULE ORAL EVERY 4 HOURS PRN
Status: DISCONTINUED | OUTPATIENT
Start: 2024-05-23 | End: 2024-05-26 | Stop reason: HOSPADM

## 2024-05-23 RX ORDER — NALOXONE HYDROCHLORIDE 0.4 MG/ML
0.1 INJECTION, SOLUTION INTRAMUSCULAR; INTRAVENOUS; SUBCUTANEOUS EVERY 5 MIN PRN
Status: DISCONTINUED | OUTPATIENT
Start: 2024-05-23 | End: 2024-05-26 | Stop reason: HOSPADM

## 2024-05-23 RX ORDER — OXYCODONE HYDROCHLORIDE 5 MG/1
10 TABLET ORAL EVERY 4 HOURS PRN
Status: DISCONTINUED | OUTPATIENT
Start: 2024-05-24 | End: 2024-05-26 | Stop reason: HOSPADM

## 2024-05-23 RX ORDER — LOPERAMIDE HYDROCHLORIDE 2 MG/1
4 CAPSULE ORAL EVERY 2 HOUR PRN
Status: DISCONTINUED | OUTPATIENT
Start: 2024-05-23 | End: 2024-05-26 | Stop reason: HOSPADM

## 2024-05-23 RX ORDER — MORPHINE SULFATE 0.5 MG/ML
INJECTION, SOLUTION EPIDURAL; INTRATHECAL; INTRAVENOUS CONTINUOUS PRN
Status: DISCONTINUED | OUTPATIENT
Start: 2024-05-23 | End: 2024-05-23

## 2024-05-23 RX ORDER — OXYTOCIN 10 [USP'U]/ML
10 INJECTION, SOLUTION INTRAMUSCULAR; INTRAVENOUS ONCE AS NEEDED
Status: DISCONTINUED | OUTPATIENT
Start: 2024-05-23 | End: 2024-05-25

## 2024-05-23 RX ORDER — CEFAZOLIN 1 G/1
INJECTION, POWDER, FOR SOLUTION INTRAVENOUS AS NEEDED
Status: DISCONTINUED | OUTPATIENT
Start: 2024-05-23 | End: 2024-05-23

## 2024-05-23 RX ORDER — OXYTOCIN/0.9 % SODIUM CHLORIDE 30/500 ML
60 PLASTIC BAG, INJECTION (ML) INTRAVENOUS ONCE AS NEEDED
Status: DISCONTINUED | OUTPATIENT
Start: 2024-05-23 | End: 2024-05-25

## 2024-05-23 RX ORDER — METHYLERGONOVINE MALEATE 0.2 MG/ML
0.2 INJECTION INTRAVENOUS ONCE AS NEEDED
Status: DISCONTINUED | OUTPATIENT
Start: 2024-05-23 | End: 2024-05-26 | Stop reason: HOSPADM

## 2024-05-23 RX ORDER — ADHESIVE BANDAGE
10 BANDAGE TOPICAL
Status: DISCONTINUED | OUTPATIENT
Start: 2024-05-23 | End: 2024-05-26 | Stop reason: HOSPADM

## 2024-05-23 RX ORDER — KETOROLAC TROMETHAMINE 30 MG/ML
INJECTION, SOLUTION INTRAMUSCULAR; INTRAVENOUS AS NEEDED
Status: DISCONTINUED | OUTPATIENT
Start: 2024-05-23 | End: 2024-05-23

## 2024-05-23 RX ORDER — HYDROMORPHONE HYDROCHLORIDE 1 MG/ML
0.2 INJECTION, SOLUTION INTRAMUSCULAR; INTRAVENOUS; SUBCUTANEOUS EVERY 5 MIN PRN
Status: CANCELLED | OUTPATIENT
Start: 2024-05-23

## 2024-05-23 RX ORDER — OXYTOCIN 10 [USP'U]/ML
10 INJECTION, SOLUTION INTRAMUSCULAR; INTRAVENOUS ONCE AS NEEDED
Status: DISCONTINUED | OUTPATIENT
Start: 2024-05-23 | End: 2024-05-26 | Stop reason: HOSPADM

## 2024-05-23 RX ORDER — ACETAMINOPHEN 325 MG/1
975 TABLET ORAL EVERY 6 HOURS
Status: DISCONTINUED | OUTPATIENT
Start: 2024-05-23 | End: 2024-05-24

## 2024-05-23 RX ORDER — BUTORPHANOL TARTRATE 2 MG/ML
1 INJECTION INTRAMUSCULAR; INTRAVENOUS
Status: DISCONTINUED | OUTPATIENT
Start: 2024-05-23 | End: 2024-05-26 | Stop reason: HOSPADM

## 2024-05-23 RX ADMIN — METHYLERGONOVINE MALEATE 0.2 MG: 0.2 INJECTION, SOLUTION INTRAMUSCULAR; INTRAVENOUS at 14:11

## 2024-05-23 RX ADMIN — LIDOCAINE HYDROCHLORIDE,EPINEPHRINE BITARTRATE 3 ML: 20; .005 INJECTION, SOLUTION EPIDURAL; INFILTRATION; INTRACAUDAL; PERINEURAL at 13:53

## 2024-05-23 RX ADMIN — DEXAMETHASONE SODIUM PHOSPHATE 4 MG: 4 INJECTION, SOLUTION INTRAMUSCULAR; INTRAVENOUS at 13:46

## 2024-05-23 RX ADMIN — PENICILLIN G 3 MILLION UNITS: 3000000 INJECTION, SOLUTION INTRAVENOUS at 06:18

## 2024-05-23 RX ADMIN — OXYTOCIN 600 MILLI-UNITS/MIN: 10 INJECTION, SOLUTION INTRAMUSCULAR; INTRAVENOUS at 14:08

## 2024-05-23 RX ADMIN — PENICILLIN G 3 MILLION UNITS: 3000000 INJECTION, SOLUTION INTRAVENOUS at 09:59

## 2024-05-23 RX ADMIN — MISOPROSTOL 800 MCG: 200 TABLET ORAL at 14:20

## 2024-05-23 RX ADMIN — LIDOCAINE HYDROCHLORIDE,EPINEPHRINE BITARTRATE 5 ML: 20; .005 INJECTION, SOLUTION EPIDURAL; INFILTRATION; INTRACAUDAL; PERINEURAL at 13:46

## 2024-05-23 RX ADMIN — PENICILLIN G 3 MILLION UNITS: 3000000 INJECTION, SOLUTION INTRAVENOUS at 02:04

## 2024-05-23 RX ADMIN — FENTANYL CITRATE 14 ML/HR: 50 INJECTION, SOLUTION INTRAMUSCULAR; INTRAVENOUS at 06:16

## 2024-05-23 RX ADMIN — LIDOCAINE HYDROCHLORIDE,EPINEPHRINE BITARTRATE 5 ML: 20; .005 INJECTION, SOLUTION EPIDURAL; INFILTRATION; INTRACAUDAL; PERINEURAL at 13:51

## 2024-05-23 RX ADMIN — KETOROLAC TROMETHAMINE 30 MG: 30 INJECTION, SOLUTION INTRAMUSCULAR; INTRAVENOUS at 14:40

## 2024-05-23 RX ADMIN — FENTANYL CITRATE 5 ML: 50 INJECTION INTRAMUSCULAR; INTRAVENOUS at 06:14

## 2024-05-23 RX ADMIN — SODIUM CHLORIDE, POTASSIUM CHLORIDE, SODIUM LACTATE AND CALCIUM CHLORIDE 125 ML/HR: 600; 310; 30; 20 INJECTION, SOLUTION INTRAVENOUS at 09:28

## 2024-05-23 RX ADMIN — KETOROLAC TROMETHAMINE 30 MG: 30 INJECTION, SOLUTION INTRAMUSCULAR; INTRAVENOUS at 20:52

## 2024-05-23 RX ADMIN — FENTANYL CITRATE 100 MCG: 50 INJECTION, SOLUTION INTRAMUSCULAR; INTRAVENOUS at 13:46

## 2024-05-23 RX ADMIN — Medication 2 MILLI-UNITS/MIN: at 03:57

## 2024-05-23 RX ADMIN — CEFAZOLIN 2 G: 1 INJECTION, POWDER, FOR SOLUTION INTRAMUSCULAR; INTRAVENOUS at 14:02

## 2024-05-23 RX ADMIN — ONDANSETRON 4 MG: 2 INJECTION INTRAMUSCULAR; INTRAVENOUS at 13:46

## 2024-05-23 RX ADMIN — FENTANYL CITRATE 5 ML: 50 INJECTION INTRAMUSCULAR; INTRAVENOUS at 06:11

## 2024-05-23 RX ADMIN — METOCLOPRAMIDE 10 MG: 5 INJECTION, SOLUTION INTRAMUSCULAR; INTRAVENOUS at 13:46

## 2024-05-23 RX ADMIN — MORPHINE SULFATE 3 MG: 0.5 INJECTION EPIDURAL; INTRATHECAL; INTRAVENOUS at 14:46

## 2024-05-23 RX ADMIN — DIPHENHYDRAMINE HYDROCHLORIDE 25 MG: 50 INJECTION, SOLUTION INTRAMUSCULAR; INTRAVENOUS at 19:39

## 2024-05-23 RX ADMIN — HYDROXYZINE HYDROCHLORIDE 25 MG: 25 TABLET, FILM COATED ORAL at 22:09

## 2024-05-23 RX ADMIN — SODIUM CHLORIDE, SODIUM LACTATE, POTASSIUM CHLORIDE, AND CALCIUM CHLORIDE: 600; 310; 30; 20 INJECTION, SOLUTION INTRAVENOUS at 13:46

## 2024-05-23 RX ADMIN — AZITHROMYCIN 500 MG: 500 INJECTION, POWDER, LYOPHILIZED, FOR SOLUTION INTRAVENOUS at 14:03

## 2024-05-23 SDOH — HEALTH STABILITY: MENTAL HEALTH: CURRENT SMOKER: 0

## 2024-05-23 ASSESSMENT — PAIN SCALES - GENERAL
PAINLEVEL_OUTOF10: 0 - NO PAIN
PAINLEVEL_OUTOF10: 8
PAINLEVEL_OUTOF10: 0 - NO PAIN
PAINLEVEL_OUTOF10: 0 - NO PAIN
PAINLEVEL_OUTOF10: 8
PAINLEVEL_OUTOF10: 8
PAINLEVEL_OUTOF10: 0 - NO PAIN

## 2024-05-23 ASSESSMENT — PAIN DESCRIPTION - DESCRIPTORS: DESCRIPTORS: SORE

## 2024-05-23 NOTE — INDIVIDUALIZED OVERALL PLAN OF CARE NOTE
Decision for  note:    Patient has been pushing with good effort  for 4 hours with pitocin augmentation for 2 hours of that time. Counseled patient on concern for increasing caput without fetal descent past +1 station for 2 hours. Would not be a good candidate for operative vaginal delivery and patient is also not amenable to that conversation. Discussed risks and benefits of  delivery inclusive of but not limited to injury to surrounding organs, infection, and bleeding-- of which she was made aware we may expect more bleeding than usual given hours of pushing. She is accepting of blood products. Patient, CNM, patient's mother and  all present for the discussion. Accepting of plan of care.     Fetal head not too engaged, unlikely to need de-stationing or fetal pillow, but will reassess in OR.   FHT during counseling 125/mod/+/variables with contractions but otherwise reassuring. Will proceed with non-scheduled, non-urgent . Plan to escalate if needed based on fetal status.     Nursing, anesthesia, and MD team made aware.    Lu Barton MD

## 2024-05-23 NOTE — ANESTHESIA POSTPROCEDURE EVALUATION
Patient: Levi Newberry    Procedure Summary       Date: 24 Room / Location: [facility] 2 OB    Anesthesia Start: 554 Anesthesia Stop:     Procedure: OBGYN Delivery  Section Diagnosis: (Arrest of Descent)    Surgeons: Ye Poole MD Responsible Provider: Tanna Verduzco MD MPH    Anesthesia Type: epidural ASA Status: 2            Anesthesia Type: epidural    Vitals Value Taken Time   /71 24 1459   Temp 37.5 24 1459   Pulse 107 24 1459   Resp 16 24 1459   SpO2 100 24 1459       Anesthesia Post Evaluation    Patient location during evaluation: bedside  Patient participation: complete - patient participated  Level of consciousness: awake and alert  Pain management: satisfactory to patient  Airway patency: patent  Cardiovascular status: acceptable and hemodynamically stable  Respiratory status: acceptable, nonlabored ventilation and room air  Hydration status: acceptable  Postoperative Nausea and Vomiting: none        There were no known notable events for this encounter.

## 2024-05-23 NOTE — DISCHARGE INSTRUCTIONS

## 2024-05-23 NOTE — INDIVIDUALIZED OVERALL PLAN OF CARE NOTE
Provider to bedside at 3.5hrs of pushing. Fetal head remains +1 and caput to +2 with pushing. Attempted turning of fetal head again, however was unsuccessful. Good effortful pushing with coaching, however maternal fatigue is evident. Would not be appropriate for operative delivery at this time.  Offered  delivery at this time given cf arrest of descent. Would like to remain pushing in hands and knees.     Will reassess after 15 mins.    Lu Barton MD

## 2024-05-23 NOTE — CARE PLAN
The patient's goals for the shift include  safe delivery    The clinical goals for the shift include      Over the shift, the patient did not make progress toward the following goals. Barriers to progression include ***. Recommendations to address these barriers include ***.

## 2024-05-23 NOTE — PROGRESS NOTES
Pt laying in bed, says ctx feel slightly stronger but not as frequent, doing ok.  , mod variability, +accels, no decels.  [place] q2-7 min.  SVE 6/80/-1.  Will start pit per protocol.  Anticipate svb.    Jennyfer jacobo cnm

## 2024-05-23 NOTE — PROGRESS NOTES
S: Levi has been pushing for three hours. Pitocin was restarted at 1111 to increase contraction frequency.    O: FHR 125bpm, mod variability, + accels, one late deceleration at 1128 and intermittent variable decelerations less than 60 seconds in duration  [place] q2-4min  Fetus at +2 station; ROP position per Dr. Barton  Oxytocin infusing at 2mu    A: IUP @ 37.5  Second Stage  Cat II fht for intermittent variable and late decelerations, overall reassuring with moderate variability and accels    P: Dr. Barton called to bedside for assessment; attempts made to rotate fetus to an OA position were unsuccessful  Adequate pelvimetry per Dr. Barton to continue pushing efforts in pursuit of an OP vaginal delivery  Continue Pitocin per protocol  Midwife remains at bedside  Will have Dr. Barton return for reassessment in 30 minutes    EVE Deng

## 2024-05-23 NOTE — PROGRESS NOTES
S: Levi consents to a cervical exam. She is unaware of her contractions.     O: FHR 130bpm, mod variability, + accels, intermittent variable decels  [place] q2-3min  SVE 10/100/0    A: IUP @ 37.5  Labor, second stage  ROM x7 hours  Cat II fht for intermittent variable decels, overall reassuring with moderate variability and accelerations  GBS Pos     P: RN to perform straight cath now, then will begin pushing efforts with patient  Dr. Cheney updated on labor progress  Anticipate SVB     Rosalva Pierre, GRACE-CNM

## 2024-05-23 NOTE — PROGRESS NOTES
S: This midwife assuming care of patientRyne Sims is asleep in a left lateral position. Support persons at bedside.     O: FHR 130bpm, mod variability, + accels, intermittent variable decels  [place] q1-3min  SVE deferred    A: IUP @ 37.5  Labor, active phase  ROM x6 hours  Cat II fht for intermittent variable decels, overall reassuring with moderate variability and accelerations  GBS Pos    P: Plan to recheck cervix at 0840, or sooner for maternal urge to push or fetal indications; Will restart pitocin at that time if unchanged  Continue PCN q4hr  Encourage position changes  Anticipate progression towards second stage and SVB    GRACE Deng-DEREK

## 2024-05-23 NOTE — CARE PLAN
Problem: Postpartum  Goal: Experiences normal postpartum course  Outcome: Progressing  Goal: Appropriate maternal -  bonding  Outcome: Progressing  Goal: Establish and maintain infant feeding pattern for adequate nutrition  Outcome: Progressing  Goal: Incisions, wounds, or drain sites healing without S/S of infection  Outcome: Progressing  Goal: No s/sx infection  Outcome: Progressing  Goal: No s/sx of hemorrhage  Outcome: Progressing     The patient's goals for the shift include rest and eat/drink without vomiting.     The clinical goals for the shift include VSS, bleeding and swelling minimal, pain controlled with medications, breastfeeding.

## 2024-05-23 NOTE — H&P
Obstetrical Admission History and Physical    17 y/o  @ 37.4 by presented with painful ctx, changed from /-2 to 80/-2 in triage, admitted for labor.  Preg notable for:  -rh neg, s/p rhogam  -fetal right pyelectasis  -depression, prev on lexapro  -episode of nonsustained ventricular tachycardia in , s/p normal echo & Holter monitor, no follow up with cards yet  -gbs+     Obstetrical History   OB History    Para Term  AB Living   1             SAB IAB Ectopic Multiple Live Births                  # Outcome Date GA Lbr Jim/2nd Weight Sex Delivery Anes PTL Lv   1 Current                Past Medical History  -Depression    Past Surgical History   No past surgical history on file.    Social History  Social History     Tobacco Use    Smoking status: Never     Passive exposure: Never    Smokeless tobacco: Never   Substance Use Topics    Alcohol use: Never     Substance and Sexual Activity   Drug Use Not Currently    Types: Marijuana    Comment: Stopped 2023       Allergies  Tylenol [acetaminophen]     Medications  Facility-Administered Medications Prior to Admission   Medication Dose Route Frequency Provider Last Rate Last Admin    prenatal vitamin calcium-iron-folic tablet 1 tablet  1 tablet oral Once Zaira Jacob MD         Medications Prior to Admission   Medication Sig Dispense Refill Last Dose    famotidine (Pepcid) 20 mg tablet Take 1 tablet (20 mg) by mouth 2 times a day. 60 tablet 11 2024    metoclopramide (Reglan) 10 mg tablet Take 1 tablet (10 mg) by mouth every 6 hours if needed (nausea and vomiting - headaches) for up to 20 days. 40 tablet 1     Prenatal Vitamin 27 mg iron- 0.8 mg tablet TAKE 1 TABLET BY MOUTH EVERY DAY 30 tablet 3 2024       Objective    Last Vitals  Temp Pulse Resp BP MAP O2 Sat   36.4 °C (97.5 °F) 95 16 135/78   100 %     Physical Examination  GENERAL: Examination reveals a well developed, well nourished, gravid female in no acute distress. She is  alert, cooperative, and uncomfortable with ctx.  ABDOMEN: soft, gravid, nontender, nondistended, no abnormal masses, no epigastric pain  FHR is 130, mod variability, +accels, no decels.  [place] reading: q2-5 min.  The fetus is in a vertex presentation, determined by ultrasound  Current Estimated Fetal Weight 7lbs established by Leopold's maneuver  CERVIX: 5 cm dilated, 80% effaced, -2 station; MEMBRANES are Intact  PSYCHOLOGICAL: awake and alert; oriented to person, place, and time    Lab Review  Labs in chart were reviewed.    A: 19 y/o  @ 37.4  Labor  Cat I FHT  GBS+  Rh neg, s/p rhogam  Fetal right pyelectasis  H/o nonsustained ventricular tachycardia in , s/p normal echo and Holter monitor, needs follow up with cards  Depression, prev on Lexapro    P: Admit, L&D orders  Expectant labor management, pit as needed  Intermittent fht auscultation per protocol  Pain management as needed  PCN for gbs prophylaxis  Anticipate svb   follow up for infant  Dr. Anthony notified of admission    Jennyfer Martin CNM

## 2024-05-23 NOTE — PROGRESS NOTES
Levi has been pushing for one hour and doing well. Still comfortable with epidural.   Cat 2 fht for intermittent variable decelerations, overall reassuring with moderate variability and presence of accels.  Ctx q2-4min  More fetal descent noted with tug-of-war technique, will continue using this  Anticipating SVB    EVE Deng

## 2024-05-23 NOTE — PROGRESS NOTES
Pt now laying down from epidural, ctx feeling better, ready for sve.  , mod variability, +accels, no decels.  [place] q2-3 min.  Pit currently off d/t pt's request.  SVE 8/90/0.  Active labor.  Continue expectant management and pcn, restart pit as needed per protocol.  Anticipate svb.    Jennyfer jacobo cnm

## 2024-05-23 NOTE — ANESTHESIA PROCEDURE NOTES
Epidural Block    Patient location during procedure: OB  Start time: 5/23/2024 5:54 AM  End time: 5/23/2024 6:08 AM  Reason for block: labor analgesia  Staffing  Performed: CHRISTINE   Authorized by: CHRISTINE Santiago    Performed by: CHRISTINE Santiago    Preanesthetic Checklist  Completed: patient identified, IV checked, risks and benefits discussed, surgical consent, pre-op evaluation, timeout performed and sterile techniques followed  Block Timeout  RN/Licensed healthcare professional reads aloud to the Anesthesia provider and entire team: Patient identity, procedure with side and site, patient position, and as applicable the availability of implants/special equipment/special requirements.  Patient on coagulant treatment: no  Timeout performed at: 5/23/2024 5:54 AM  Block Placement  Patient position: sitting  Prep: ChloraPrep  Sterility prep: mask, gloves, drape and cap  Sedation level: no sedation  Patient monitoring: heart rate, continuous pulse oximetry and blood pressure  Approach: midline  Local numbing: lidocaine 1% to skin and subcutaneous tissues  Vertebral space: lumbar  Lumbar location: L3-L4  Epidural  Loss of resistance technique: saline  Guidance: landmark technique        Needle  Needle type: Tuohy   Needle gauge: 17  Needle length: 10.2 cm  Needle insertion depth: 6 cm  Catheter type: multi-orifice  Catheter size: 19 G  Catheter at skin depth: 11 cm  Catheter securement method: clear occlusive dressing and liquid medical adhesive    Test dose: lidocaine 1.5% with epinephrine 1-to-200,000  Test dose: lidocaine 1.5% with epinephrine 1-to-200,000  Test dose result: no positive test dose    PCEA  Medication concentration used: 0.044% Bupivacaine with 1.25 mcg/mL Fentanyl and [id number] Epinephrine  Dose (mL): 10  Lockout (minutes): 15  1-Hour Limit (boluses/hr): 4  Basal Rate: 14        Assessment  Sensory level: T10 bilateral  Block outcome: patient comfortable  Number of attempts: 1  Events: no  positive test dose  Procedure assessment: patient tolerated procedure well with no immediate complications

## 2024-05-23 NOTE — L&D DELIVERY NOTE
I was present for the key portions of this procedure.  Ye Poole MD       OB Delivery Note  2024  Levi Newberry  18 y.o.   , Low Transverse        Gestational Age: 37w5d  /Para:   Quantitative Blood Loss: Admission to Discharge: 834 mL (2024  6:36 PM - 2024  4:55 PM)    Sunil NewberryhGirl [[id number]]      Labor Events    Rupture date/time: 2024 0132  Rupture type: Artificial  Fluid color: Clear  Labor type: Spontaneous Onset of Labor  Labor allowed to proceed with plans for an attempted vaginal birth?: Yes  Augmentation: AROM, Oxytocin  Augmentation indications: Ineffective Contraction Pattern  Complications: Failure to Progress in Second Stage       Labor Event Times    Labor onset date/time: 2024  Dilation complete date/time: 2024 0841  Start pushing date/time: 2024 0853       Placenta    Placenta delivery date/time: 2024 1410  Placenta removal: Expressed  Placenta appearance: Intact  Placenta disposition: discarded       Cord    Vessels: 3 vessels  Complications: None  Delayed cord clamping?: Yes  Cord clamped date/time: 2024 1407  Cord blood disposition: Lab, Refrigerator  Gases sent?: No  Stem cell collection (by provider): No       Lacerations    Episiotomy: None  Perineal laceration: None  Repair suture: None       Anesthesia    Method: Epidural       Operative Delivery    Forceps attempted?: No  Vacuum extractor attempted?: No       Shoulder Dystocia    Shoulder dystocia present?: No       [place] Delivery    Birth date/time: 2026 14:08:00  Delivery type: , Low Transverse   categorization: primary   priority: routine  Indications for : Arrest of Descent  Incision type: low transverse  Complications: Failure to Progress in Second Stage       Resuscitation    Method: Tactile stimulation       Apgars    Living status:   Apgar Component Scores:  1 min.:  5 min.:  10 min.:  15  min.:  20 min.:    Skin color:         Heart rate:         Reflex irritability:         Muscle tone:         Respiratory effort:         Total:                Delivery Providers    Delivering clinician: Ye Poole MD   Provider Role    Annie Ivory RN Delivery Nurse    Yecenia Martinez RN Nursery Nurse    Lu Barton MD Resident    Rosalva Pierre, GRACE-CNM Midwife                 Decision for : Arrest of descent with 4 hours of pushing with good maternal effort and increasing caput. Fetal head in direct OP positioning.    Pre op diagnosis:    1. IUP at 37.5wga weeks   2. Labor  3. Arrest of descent  Post op diagnosis: Same    Findings: Normal appearing gravid uterus, fallopian tubes, and ovaries. Amniotic fluid clear, female infant in cephalic direct occiput posterior presentation    Procedure: Patient was taken to the OR where epidural anesthesia was dosed.  She was then placed in the dorsal supine position with a left lateral tilt. A lange catheter was placed, SCD's were applied, and a vaginal prep was performed. A pre-procedure time out was performed.  The patient was given prophylactic dose of IV antibiotics.  She was then prepped and draped in the usual sterile fashion. A Pfannenstiel skin incision was made with the scalpel through the skin and subcutaneous fat to the underlying fascial layer.  The fascia was incised in the midline with the scalpel and the incision was bluntly extended bilaterally. The muscles were divided in the midline, the peritoneum was then identified and entered bluntly and incision extended superiorly and inferiorly taking care to avoid underlying viscera.  The bladder blade was inserted.       A low transverse uterine incision was made with the scalpel, the uterine cavity was entered, and the hysterotomy was extended bilaterally with cephalocaudal stretch.  The infant was delivered atraumatically, the cord was clamped and cut and infant was handed off to awaiting  nursing.  A blood sample was collected.  The placenta was then expressed.  The uterus was exteriorized and cleared of all clot and debris. The uterine incision was repaired using a running locked stitch of 0-Vicryl. A second layer of the same suture was placed in an imbricating fashion. Good hemostasis was noted. The right lateral edge was noted to be oozy and was sutured with 2-0 Monocryl in a running stitch. Hemostasis was achieved. Of note, the anterior leaf of the broad ligament was disrupted but intact posteriorly. The peritoneum was hemostatic.     The uterus was the placed back inside the pelvis, the gutters were cleared of all clots and debris.  The hysterotomy was again evaluated and found to be hemostatic, the underside of the fascia and bladder and the rectus muscles were also found to be hemostatic.  The fascia was closed using a running stitch of looped 0-PDS.  The subcutaneous layer was irrigated, small bleeders were cauterized. The skin was closed with 4-0 Monocryl.  All counts were correct, the patient tolerated the procedure well.  Dr. Poole was present for the entire procedure.  The patient and infant were taken back to the recovery room in stable condition.

## 2024-05-23 NOTE — PROGRESS NOTES
Pt pushing for 4 hours. Minimal change noted in fetal station.   FHR 115bpm, mod variability, + accels, intermittent late and variable decelerations noted.   Dr. Barton called back to bedside for reassessment.    section was offered at this time for failure to descend. Pt wishes to try pushing in a hands-and-knees position before making a decision. Midwife remains at bedside. Dr. Barton to update Dr. Poole on pt progress.     Rosalva Pierre, GRACE-DEREK

## 2024-05-23 NOTE — ANESTHESIA PREPROCEDURE EVALUATION
Patient: Levi SIMS Rohith    Evaluation Method: In-person visit    Procedure Information    Date: 24  Procedure: Labor Analgesia       18 y.o.  at 37w5d    Relevant Problems   Anesthesia (within normal limits)      Cardiac (within normal limits)      Pulmonary (within normal limits)      Neuro   (+) Generalized anxiety disorder   (+) Major depressive disorder      GI (within normal limits)      /Renal (within normal limits)      Liver (within normal limits)      Endocrine (within normal limits)      Hematology (within normal limits)      Musculoskeletal (within normal limits)      HEENT (within normal limits)      ID (within normal limits)      Skin (within normal limits)      GYN   (+) Encounter for supervision of normal pregnancy in third trimester ([facility])     No past surgical history on file.    Clinical information reviewed:    Allergies  Meds               NPO Detail:  No data recorded     OB/GYN     Physical Exam    Airway  Mallampati: II  TM distance: >3 FB  Neck ROM: full     Cardiovascular    Dental - normal exam     Pulmonary    Abdominal          Anesthesia Plan    History of general anesthesia?: no  History of complications of general anesthesia?: no    ASA 2     epidural     The patient is not a current smoker.    Anesthetic plan and risks discussed with patient.  Use of blood products discussed with patient who consented to blood products.    Plan discussed with CAA.

## 2024-05-23 NOTE — SIGNIFICANT EVENT
Provider to bedside to assess for second stage progress.   CNM indicates 2.5 hrs of pushing right now with some decent. Started at 0 station.    On exam, likely LOP. Was able to turn to direct OP but not to OA. Maternal effort good with +1 station and decent noted during pushing.     Will reassess in 15-30 mins for progress.       Dr Poole notified.   Lu Barton MD

## 2024-05-23 NOTE — PROGRESS NOTES
S: Levi has been pushing for two hours. Starting to get fatigued. Have tried side-lying positions as well as squatting. She is still comfortable with her epidural analgesia.     O: FHR 125bpm, mod variability, + accels, intermittent variable decels <60 seconds in duration  [place] q2-4min  Fetal descent noted to +2 station, albeit with increasing caput    A: IUP @ 37.5  Second Stage  Cat II fht for variable decelerations, overall reassuring with moderate variability and accels    P: Updated Dr. Poole and Shravan on patient progress  Midwife remains at bedside during pushing efforts  Anticipate SVB    Rosalva Pierre, APRN-CNM

## 2024-05-23 NOTE — PROGRESS NOTES
Pt resting in bed, ctx feel about the same. Agreeable to sve and possible arom.   per intermittent auscultation, no decels noted.  Ctx approx q few min.  SVE 6/80/-2, arom'd for clear fluid.  Continue current course.  Anticipate svb.    Jennyfer COLLINS

## 2024-05-24 LAB
ERYTHROCYTE [DISTWIDTH] IN BLOOD BY AUTOMATED COUNT: 14.4 % (ref 11.5–14.5)
FETAL MATERNAL SCREEN: NORMAL
HCT VFR BLD AUTO: 31.9 % (ref 36–46)
HGB BLD-MCNC: 10.7 G/DL (ref 12–16)
MCH RBC QN AUTO: 31.3 PG (ref 26–34)
MCHC RBC AUTO-ENTMCNC: 33.5 G/DL (ref 32–36)
MCV RBC AUTO: 93 FL (ref 80–100)
NRBC BLD-RTO: 0 /100 WBCS (ref 0–0)
PLATELET # BLD AUTO: 162 X10*3/UL (ref 150–450)
RBC # BLD AUTO: 3.42 X10*6/UL (ref 4–5.2)
WBC # BLD AUTO: 17.4 X10*3/UL (ref 4.4–11.3)

## 2024-05-24 PROCEDURE — 2500000004 HC RX 250 GENERAL PHARMACY W/ HCPCS (ALT 636 FOR OP/ED): Performed by: STUDENT IN AN ORGANIZED HEALTH CARE EDUCATION/TRAINING PROGRAM

## 2024-05-24 PROCEDURE — 36415 COLL VENOUS BLD VENIPUNCTURE: CPT | Performed by: STUDENT IN AN ORGANIZED HEALTH CARE EDUCATION/TRAINING PROGRAM

## 2024-05-24 PROCEDURE — 85027 COMPLETE CBC AUTOMATED: CPT | Performed by: STUDENT IN AN ORGANIZED HEALTH CARE EDUCATION/TRAINING PROGRAM

## 2024-05-24 PROCEDURE — 2500000001 HC RX 250 WO HCPCS SELF ADMINISTERED DRUGS (ALT 637 FOR MEDICARE OP): Performed by: STUDENT IN AN ORGANIZED HEALTH CARE EDUCATION/TRAINING PROGRAM

## 2024-05-24 PROCEDURE — 1100000001 HC PRIVATE ROOM DAILY

## 2024-05-24 PROCEDURE — 85461 HEMOGLOBIN FETAL: CPT

## 2024-05-24 PROCEDURE — 3E0234Z INTRODUCTION OF SERUM, TOXOID AND VACCINE INTO MUSCLE, PERCUTANEOUS APPROACH: ICD-10-PCS | Performed by: OBSTETRICS & GYNECOLOGY

## 2024-05-24 PROCEDURE — 2500000004 HC RX 250 GENERAL PHARMACY W/ HCPCS (ALT 636 FOR OP/ED)

## 2024-05-24 PROCEDURE — 36415 COLL VENOUS BLD VENIPUNCTURE: CPT

## 2024-05-24 RX ADMIN — OXYCODONE HYDROCHLORIDE 5 MG: 5 TABLET ORAL at 15:13

## 2024-05-24 RX ADMIN — KETOROLAC TROMETHAMINE 30 MG: 30 INJECTION, SOLUTION INTRAMUSCULAR; INTRAVENOUS at 02:42

## 2024-05-24 RX ADMIN — IBUPROFEN 600 MG: 600 TABLET, FILM COATED ORAL at 15:13

## 2024-05-24 RX ADMIN — IBUPROFEN 600 MG: 600 TABLET, FILM COATED ORAL at 21:11

## 2024-05-24 RX ADMIN — ENOXAPARIN SODIUM 40 MG: 100 INJECTION SUBCUTANEOUS at 08:59

## 2024-05-24 RX ADMIN — RHO(D) IMMUNE GLOBULIN (HUMAN) 300 MCG: 1500 SOLUTION INTRAMUSCULAR at 15:19

## 2024-05-24 RX ADMIN — KETOROLAC TROMETHAMINE 30 MG: 30 INJECTION, SOLUTION INTRAMUSCULAR; INTRAVENOUS at 08:59

## 2024-05-24 ASSESSMENT — PAIN SCALES - GENERAL
PAINLEVEL_OUTOF10: 7

## 2024-05-24 ASSESSMENT — PAIN DESCRIPTION - LOCATION: LOCATION: INCISION

## 2024-05-24 ASSESSMENT — PAIN DESCRIPTION - DESCRIPTORS: DESCRIPTORS: SORE

## 2024-05-24 NOTE — CARE PLAN
Patient is progressing through goals. Lochia is light, stable vital signs, and lange was removed      Problem: Postpartum  Goal: Experiences normal postpartum course  Outcome: Progressing     Problem: Postpartum  Goal: Appropriate maternal -  bonding  Outcome: Progressing     Problem: Postpartum  Goal: Establish and maintain infant feeding pattern for adequate nutrition  Outcome: Progressing

## 2024-05-24 NOTE — LACTATION NOTE
Lactation Consultant Note    Recommendations/Summary  Baby was asleep in her bassinet when I came to talk to mom. Her RN reported that baby has been spitty and just spit up a large amount of amniotic fluid. Baby does not turn 24 hours old until this afternoon. Reviewed with mom that when babies are under 24 hours of life, they are typically very sleepy and can also be spitty. Both of these factors contribute to babies not being interested in feeding during this period. Discussed benefits of skin to skin contact for mom and baby and for mom's milk production and supply. Discussed cluster feeding during the second 24 hours of life. Reviewed differences between colostrum and mature milk and that full milk supply typically comes in 3-5 days after delivery. Encouraged mom to continue to attempt to feed in skin to skin every 2-3 hours and to call for lactation assistance when baby is showing hunger cues. We will attempt to see a feed this afternoon/evening when baby is hopefully more awake and interested in feeding. Mom has a pump for at home. Reviewed the outpatient lactation information.

## 2024-05-24 NOTE — ADDENDUM NOTE
Addendum  created 05/24/24 0835 by Luis Mathew, [facility]    Clinical Note Signed

## 2024-05-24 NOTE — PROGRESS NOTES
"Social Work Brief Note     Patient: Levi Newberry   Name:   [place] : 24      Reason for Visit: SW consulted due to hx of suicide attempts, depression, anxiety.       History: Per chart, Mom with hx of 3 suicide attempts; 2020-ingested Tylenol, but was not admitted to a psych inpatient unit, 2022-ingested Tylenol and was admitted at , and in 2022-ingested naproxen and admitted to [facility]. Mom has a history of being linked with emids ([place])-WILLIAN Arriaga for counseling in , but this discontinued due to no shows to appointments and ELIZABETH Gonzales, also with [facility] for med management. Per chart, mom has a history of being prescribed Lexapro, 20mg in , Trazodone, 100mg in , and Hydroxozine, 25mg, in . Mom also has a history of being prescribed Wellbutrin; unknown when mom took this, but per chart, mom stopped taking this due to feeling it was \"too strong.\"     Per chart, mom linked back with [facility] and completed an intake assessment in 2024 which a counseling and psychiatry referral were placed. Mom started back counseling with WILLIAN Arriaga on 24 , had an appt on 5/15/24, and next appt is 24.    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 Mom lives with parents. PNC through .     Assessment: SW has attempted to see mom X2, but mom sleeping.       Plan:  SW to continue to attempt to see mom.       Signature:      INOCENCIA Finch    1245pm ADDENDUM: SW attempted to see mom again, mom still sleeping; baby at bedside also asleep. SW to continue to attempt to meet with mom.    INOCENCIA Man    155pm ADDENDUM: SW attempted to meet with mom fort assessment, mom still sleeping and baby at bedside sleeping. SW to continue to follow up.    INOCENCIA Man      "

## 2024-05-24 NOTE — PROGRESS NOTES
Levi Newberry is a 18 y.o. female on day 2 of admission presenting with Normal labor ([facility]).    Social Work Assessment     MOB: Levi Newberry    MOB :     Address: [address] [place] [address] [address]    Phone: [phone]    FOB: MOB states FOB is not consistently involved.    [place] name: Anny Newberry     : 24    Referral Reason: hx depression    Prenatal Care: [place]    Other Children: none, first child    Household Composition: MOB and MOB's mother    IPV/DV or Safety Concerns: denies    Car-Seat: yes    Safe Sleep Space: yes    Safe Sleep Education: provided    [facility]    Transportation Concerns: denies    School/Work/Income: Nursing Aide, has [employer]    Insurance: [employer]    Financial Assistance: is involved with [employer]    Mental Health Diagnoses: hx depression and SI. Reviewed PPD symptoms. Denies depression symptoms or SI/HI currently.    Medication(s): none currently    Counseling: [facility]    Supports: MOB's mother and Aunt    Substance Use: denies    Plan  MOB seen for SW assessment. MOB was cooperative and engaged in assessment. MOB denies depression symptoms at this time and is connected to support through [employer]. No further concerns/support needed. MOB and  are cleared by SW once medically ready for discharge.     EVERT Gonzalez, LANI

## 2024-05-24 NOTE — CARE PLAN
Problem: Postpartum  Goal: Experiences normal postpartum course  Outcome: Progressing  Goal: Appropriate maternal -  bonding  Outcome: Progressing  Goal: Establish and maintain infant feeding pattern for adequate nutrition  Outcome: Progressing  Goal: Incisions, wounds, or drain sites healing without S/S of infection  Outcome: Progressing  Goal: No s/sx infection  Outcome: Progressing  Goal: No s/sx of hemorrhage  Outcome: Progressing     The patient's goals for the shift include rest and breastfeeding.     The clinical goals for the shift include VSS, bleeding and swelling minimal, pain controlled with medications, breastfeeding.

## 2024-05-24 NOTE — ANESTHESIA POSTPROCEDURE EVALUATION
Patient: Levi Newberry is a 18 y.o., , who had a , Low Transverse  delivery on 2024  at 37w5d and is now POD1.    She had Neuraxial Anesthesia without immediate complications noted.       Pain well controlled    Vitals:    24 0309   BP: 106/70   Pulse: 62   Resp: 16   Temp: 36.5 °C (97.7 °F)   SpO2: 95%       Neuraxial site assessed. No visible redness or swelling or drainage. Patient able to ambulate and move all extremities without difficulty. Able to void. No complaints of nausea/vomiting. Tolerating PO intake well. No s/sx of PDPH.     Anesthesia will sign off     Luis Mathew, CAA

## 2024-05-24 NOTE — PROGRESS NOTES
Postpartum Progress Note    Assessment/Plan   Levi Newberry is a 18 y.o., , who delivered at 37w5d gestation via pCS for arrest of descent and is now postpartum day 1.    s/p LTCS on ,  mL  - continue routine postoperative care  - pain well controlled, transition to PO meds--> declines tylenol, discontinued order  - Hgb   Results from last 7 days   Lab Units 24  0719 24  1723 24  2154   HEMOGLOBIN g/dL 10.7* 11.6* 12.4     - DVT Score: 5, for ppx lovenox    Elevated BP without diagnosis of HDP  - Mild range BPs <4 hrs apart, severe range BP in s/o arm movement  - Normotensive today  - Baseline HELLP labs negative  - Signs and symptoms of PEC reviewed    H/o Ventricular tachycardia, nonsustained  - During hospitalization in s/o tylenol overdose in 2022  - Holter negative, TTE WNL  - Denies palpitations    Maternal Well-Being  - emotional support provided  - bonding with infant  - MDD, h/o suicide attempts via overdose, last 2022: lexapro rx'd but not taking; follows with therapist; Denies SI/HI    [place] Feeding  - breastfeeding/pumping encouraged; lactation consult prn     Contraception  - Defer discussion to POD2    Dispo  - anticipate d/c on POD #3 if meeting all post-op and postpartum milestones  - for f/u 2wks with Primary OB provider      Principal Problem:    Normal labor ([facility])  Active Problems:    Major depressive disorder    Nonsustained ventricular tachycardia (Multi)    Suicide attempt (Multi)    Rh negative status during pregnancy in first trimester ([facility])    Generalized anxiety disorder     delivery delivered ([facility])    Pregnancy Problems (from 10/05/23 to present)       Problem Noted Resolved    Positive GBS test 2024 by GRACE Thorne-CNM, APRN-CNP No    Priority:  Medium      Encounter for supervision of normal pregnancy in third trimester ([facility]) 3/21/2024 by GRACE Baxter-DEREK No    Priority:  Medium       Overview Addendum 2024 11:36 AM by Claudia Edwards MD     Datinw us   [x] Initial BMI: 28  [] Prenatal Labs:   [x] Genetic Screening:  rr CF SNA  [x] Baby ASA: NO  [] Anatomy US:  [x] 1hr GCT at 24-28wks: 51  [x] Tdap (27-36wks): yes, 3/21/2024   [] COVID vaccine:   [x] Rhogam (if Rh neg): S/p rhogam 3/21/2024   [x] GBS at 36 wks: pos  [] Breastfeeding  [] Postpartum Birth control method:   [] 39 weeks discussion of IOL vs. Expectant management:  [] Mode of delivery:           Rh negative status during pregnancy in first trimester ([facility]) 2023 by EVE Baxter No    Priority:  Medium      Overview Addendum 2024 10:12 AM by Cristal Sloan MD     S/p rhogam 3/21/2024         Cannabis abuse, episodic 2023 by EVE Baxter No    Priority:  Medium      Overview Addendum 2024  8:29 AM by EVE Thorne, APRN-CNP     Pos Utox from [facility], stopped using  Tox screen  negative         Nonsustained ventricular tachycardia (Multi) 10/4/2023 by Kassi Jeffery No    Priority:  Medium      Overview Addendum 2024  9:04 AM by Batsheva Ayala MD     - Nonsustained VT identified during hospitalization 2022 in s/o acetaminophen overdose   - Neg Holter monitor workup, neg echo, cardiac MRI  - Referred to cards         Major depressive disorder 2022 by Kassi Jeffery No    Priority:  Medium      Overview Addendum 2024  9:13 AM by Batsheva Ayala MD     - Rx'd lexapro, not taking  - Follows with therapist   - Hx suicide attempts via overdose, last in 2022         High risk teen pregnancy, antepartum ([facility]) 2023 by Dimple Nagy, RN 2024 by Dimple Nagy, RN    Marijuana use during pregnancy ([facility]) 2023 by Dimple Nagy RN 2024 by Dimple Nagy RN    Pregnancy, location unknown ([facility]) 10/5/2023 by Cristal Sloan MD 10/11/2023 by Cristal Sloan MD    Overview Addendum 10/7/2023 10:17 AM by  Cristal Sloan MD     - LMP uncertain, ~6-8wks   - IUP not visualized on BSUS  - TVUS on 10/6 with gestational sac visualized, ~5wks.          8 weeks gestation of pregnancy ([facility]) 10/5/2023 by Cristal Sloan MD 2024 by Xiao Kim, APRN-[facility]          Hospital course: no complications   section delivery  Patient is currently breastfeedingThe patient's blood type is O NEG. The baby's blood type is O POS . Rhogam indicated and given.    Subjective   Her pain is well controlled with current medications  She is passing flatus  She is ambulating well  She is tolerating a Adult diet Regular  She reports no breast or nursing problems  She denies emotional concerns today   Her plan for contraception is unknown, defer discussion to POD2     Denies HA, SOB, RUQ pain, vision changes.   Denies SI/HI and symptoms of depression/anxiety since delivery    Objective   Allergies:   Tylenol [acetaminophen]         Last Vitals:  Temp Pulse Resp BP MAP Pulse Ox   36.8 °C (98.2 °F) 73 18 106/68   93 %     Vitals Min/Max Last 24 Hours:  Temp  Min: 36.5 °C (97.7 °F)  Max: 37.5 °C (99.5 °F)  Pulse  Min: 62  Max: 105  Resp  Min: 12  Max: 18  BP  Min: 106/68  Max: 134/88    Intake/Output:     Intake/Output Summary (Last 24 hours) at 2024 1715  Last data filed at 2024 0713  Gross per 24 hour   Intake 1487 ml   Output 2105 ml   Net -618 ml       Physical Exam:  Incision: healing well, no erythema, no swelling, well approximated, small amount of sanguinous drainage on R side of incision after removal of OR dressing.  General: Examination reveals a well developed, well nourished, female, in no acute distress. She is alert and cooperative.  Lungs: symmetrical, non-labored breathing.  Cardiac: warm, well-perfused.  Abdomen: soft, non-tender.  Fundus: firm, below umbilicus, and nontender.  Extremities: no redness or tenderness in the calves or thighs.  Neurological: alert, oriented, normal speech, no focal findings or  movement disorder noted.     Lab Data:  Labs in chart were reviewed.

## 2024-05-25 PROCEDURE — 2500000005 HC RX 250 GENERAL PHARMACY W/O HCPCS: Performed by: STUDENT IN AN ORGANIZED HEALTH CARE EDUCATION/TRAINING PROGRAM

## 2024-05-25 PROCEDURE — 1100000001 HC PRIVATE ROOM DAILY

## 2024-05-25 PROCEDURE — 2500000001 HC RX 250 WO HCPCS SELF ADMINISTERED DRUGS (ALT 637 FOR MEDICARE OP): Performed by: STUDENT IN AN ORGANIZED HEALTH CARE EDUCATION/TRAINING PROGRAM

## 2024-05-25 PROCEDURE — 2500000004 HC RX 250 GENERAL PHARMACY W/ HCPCS (ALT 636 FOR OP/ED): Performed by: STUDENT IN AN ORGANIZED HEALTH CARE EDUCATION/TRAINING PROGRAM

## 2024-05-25 RX ORDER — FAMOTIDINE 20 MG/1
20 TABLET, FILM COATED ORAL 2 TIMES DAILY
Status: DISCONTINUED | OUTPATIENT
Start: 2024-05-25 | End: 2024-05-26 | Stop reason: HOSPADM

## 2024-05-25 RX ADMIN — FAMOTIDINE 20 MG: 20 TABLET, FILM COATED ORAL at 15:18

## 2024-05-25 RX ADMIN — IBUPROFEN 600 MG: 600 TABLET, FILM COATED ORAL at 21:20

## 2024-05-25 RX ADMIN — POLYETHYLENE GLYCOL 3350 17 G: 17 POWDER, FOR SOLUTION ORAL at 09:33

## 2024-05-25 RX ADMIN — IBUPROFEN 600 MG: 600 TABLET, FILM COATED ORAL at 03:14

## 2024-05-25 RX ADMIN — IBUPROFEN 600 MG: 600 TABLET, FILM COATED ORAL at 15:18

## 2024-05-25 RX ADMIN — IBUPROFEN 600 MG: 600 TABLET, FILM COATED ORAL at 09:00

## 2024-05-25 RX ADMIN — POLYETHYLENE GLYCOL 3350 17 G: 17 POWDER, FOR SOLUTION ORAL at 21:33

## 2024-05-25 RX ADMIN — OXYCODONE HYDROCHLORIDE 5 MG: 5 TABLET ORAL at 19:35

## 2024-05-25 RX ADMIN — BENZOCAINE AND LEVOMENTHOL 1 APPLICATION: 200; 5 SPRAY TOPICAL at 21:33

## 2024-05-25 RX ADMIN — ENOXAPARIN SODIUM 40 MG: 100 INJECTION SUBCUTANEOUS at 09:00

## 2024-05-25 RX ADMIN — Medication 1 EACH: at 21:33

## 2024-05-25 ASSESSMENT — PAIN SCALES - GENERAL
PAINLEVEL_OUTOF10: 6
PAINLEVEL_OUTOF10: 6
PAINLEVEL_OUTOF10: 0 - NO PAIN
PAINLEVEL_OUTOF10: 6
PAINLEVEL_OUTOF10: 8

## 2024-05-25 ASSESSMENT — PAIN DESCRIPTION - DESCRIPTORS
DESCRIPTORS: CRAMPING;SORE;DISCOMFORT
DESCRIPTORS: CRAMPING;SORE;DISCOMFORT

## 2024-05-25 NOTE — LACTATION NOTE
Lactation Consultant Note  Lactation Consultation  Reason for Consult: Follow-up assessment, Infant weight loss  Consultant Name: Mariama Johnson RN IBCLC    Maternal Information  Has mother  before?: No    Maternal Assessment  Breast Assessment: Medium, Symmetrical, Filling, Soft, Compressible  Nipple Assessment: Erect with stimulation, Intact, Piercing present  Areola Assessment: Normal    Infant Assessment  Infant Behavior: Light sleep, Suckles on and off, needs stimulation  Infant Assessment: Good cupping of tongue, Tongue protrudes over alveolar ridge    Feeding Assessment  Nutrition Source: Breastmilk, Formula (medically indicated)  Feeding Method: Nursing at the breast, Paced bottle  Feeding Position: Cross - cradle, Skin to skin, Mother needs assistance with latch/positioning  Suck/Feeding: Sustained, Coordinated suck/swallow/breathe, Tactile stimulation needed, Swallowing intermittently only with encouragement  Latch Assessment: Maximum assistance is needed, Eagerly grasped on to latch, Deep latch obtained, Optimal angle of mouth opening, Bursts of sucking, swallowing, and rest, Flanged lips, Chin moves in rhythmic motion, Sucking and swallowing, Sucks with long jaw movement, Comfortable latch    LATCH TOOL  Latch: Repeated attempts, hold nipple in mouth, stimulate to suck  Audible Swallowing: A few with stimulation  Type of Nipple: Everted (After stimulation)  Comfort (Breast/Nipple): Soft/non-tender  Hold (Positioning): Minimal assist, teach one side, mother does other, staff holds  LATCH Score: 7    Breast Pump  Pump: Hospital grade electric pump, Double breast pumping (instructed mother to call when feeding completed for pumping instructions- mother instructed to pump after each infant feeding- RN notified to instruct pumping with mother)    Other OB Lactation Tools       Patient Follow-up  Inpatient Lactation Follow-up Needed : Yes    Other OB Lactation Documentation  Maternal Risk Factors:   delivery, Other (comment) (HX MDD- HX SA)  Infant Risk Factors: Early term birth 37-39 weeks    Recommendations/Summary  Mother holding infant cradled in her arms. She reported that she had latched infant to her right breast with bedside RN assist and just finished then supplementing infant with 17ml of formula via the bottle. Infant with a weight loss now at 9% @ 43 HOL. Suggested that we attempt to latch infant to mothers left breast for lactation guidance and further education. Mother agreeable. Instructed mother on how to properly hold and support both her breast and infant while nursing. Discussed keeping infant in good alignment and obtaining a deep and effective latch throughout entire feeding session. Positioned infant to latch using a cross cradle hold. Assisted mother with latching infant deeply to her left breast. Infant was able to hold and sustain a good latch while feeding. Infant did require stimulation to keep interested as she had recently fed from her other breast and bottle. Reviewed with mother various ways to awaken a sleepy and sluggish infant at  breast. Due to weight loss I stressed with mother the need and importance of offering infant feeding frequently and often with a proper latch as well. Instructed to feed infant every 2-3 hours, pump after each feeding and then follow with supplement. Per bedside RN she reported that peds ordered 10-15ml of supplement. Instructed mother to call when feeding completed for instructions on using the symphony breast pump. I spoke directly to bedside RN to be sure to instruct mother on pumping and the feeding plan was reviewed as well. Encouraged mother to call for feeding assistance. Mother has a breast pump for home.

## 2024-05-25 NOTE — CARE PLAN
Patient is progressing through goals. Lochia is light, pain is well controlled, and vitals are stable.       Problem: Postpartum  Goal: Experiences normal postpartum course  Outcome: Progressing     Problem: Postpartum  Goal: Appropriate maternal -  bonding  Outcome: Progressing     Problem: Postpartum  Goal: Establish and maintain infant feeding pattern for adequate nutrition  Outcome: Progressing     Problem: Postpartum  Goal: Incisions, wounds, or drain sites healing without S/S of infection  Outcome: Progressing     Problem: Postpartum  Goal: No s/sx infection  Outcome: Progressing     Problem: Postpartum  Goal: No s/sx of hemorrhage  Outcome: Progressing

## 2024-05-25 NOTE — CARE PLAN
The patient's goals for the shift include   Problem: Postpartum  Goal: Experiences normal postpartum course  Outcome: Progressing  Goal: Establish and maintain infant feeding pattern for adequate nutrition  Outcome: Progressing     Problem: Safety - Adult  Goal: Free from fall injury  Outcome: Progressing         VSS, bleeding and swelling minimal, pain controlled with medications, IV flushed, incision infection free, breastfeeding/formula feeding/pumping.

## 2024-05-25 NOTE — PROGRESS NOTES
Postpartum Progress Note    Assessment/Plan     Levi Newberry is a 18 y.o., , who initially presented for labor, had CS for arrest of descent.  She had a , Low Transverse  delivery on 2024  at 37w5d and is now POD2.    #Post-op , Low Transverse   - continue routine postoperative care  - pain well controlled on ERAS protocol  - Hg 12.4   -->  -> POD1 10.7  - DVT Score: 5 SCDs and enoxaparin prophylactic dose daily while inpatient  - The patient's blood type is O NEG. The baby's blood type is O POS . Rhogam indicated and given.    Elevated BP without diagnosis of HDP  - Mild range BPs <4 hrs apart, severe range BP in s/o arm movement  - Normotensive today  - Baseline HELLP labs negative  - Signs and symptoms of PEC reviewed     H/o Ventricular tachycardia, nonsustained  - During hospitalization in s/o tylenol overdose in 2022  - Holter negative, TTE WNL  - Denies palpitations     Maternal Well-Being  - emotional support provided  - bonding with infant  - MDD, h/o suicide attempts via overdose, last 2022: lexapro rx'd but not taking; follows with therapist; Denies SI/HI; SW consult placed    #Contraception  Depo-Provera    #Maternal Well-Being  - emotional support provided  - bonding with infant @ bedside  -  feeding: breastfeeding/pumping encouraged; lactation consult prn     #Dispo  - anticipate d/c on POD #3 if meeting all post-op and postpartum milestones  - for f/u 2wks with Primary OB provider     Principal Problem:    Normal labor ([facility])  Active Problems:    Major depressive disorder    Nonsustained ventricular tachycardia (Multi)    Suicide attempt (Multi)    Rh negative status during pregnancy in first trimester ([facility])    Generalized anxiety disorder     delivery delivered ([facility])      Subjective   Her pain is well controlled with current medications  She is passing flatus  She is ambulating independently  She is tolerating a Adult  diet Regular  She is voiding spontaneously  She reports no breast or nursing problems  She denies emotional concerns today     Pt seen sitting at edge of bed. Endorses gas pain. Passing flatus but no BM.     Objective   Last Vitals:  Temp Pulse Resp BP MAP Pulse Ox   36.6 °C (97.9 °F) 105 17 113/74   98 %     Vitals Min/Max Last 24 Hours:  Temp  Min: 36.6 °C (97.9 °F)  Max: 37 °C (98.6 °F)  Pulse  Min: 70  Max: 105  Resp  Min: 16  Max: 18  BP  Min: 97/62  Max: 118/77    Intake/Output:     Intake/Output Summary (Last 24 hours) at 5/25/2024 1059  Last data filed at 5/25/2024 0015  Gross per 24 hour   Intake --   Output 1000 ml   Net -1000 ml       Physical Exam:  General: well appearing, well nourished, postpartum  Obstetric: fundus firm below umbilicus, lochia light  Skin: Warm, dry; no rashes/lesions/erythema; Pfannenstiel incision: clean, dry, well approximated, open to air, negative discharge/warmth/erythema   Breast: No masses, nipple discharge  Neuro: A/Ox3, no gross motor deficit   GI: mild distension, appropriately tender, soft, +BS  Respiratory: Even and unlabored on RA, LSCTA BL  Cardiovascular: No BLE edema; No erythema, warmth  Psych: appropriate mood and affect    Lab Data:  Lab Results   Component Value Date    WBC 17.4 (H) 05/24/2024    HGB 10.7 (L) 05/24/2024    HCT 31.9 (L) 05/24/2024     05/24/2024

## 2024-05-26 VITALS
SYSTOLIC BLOOD PRESSURE: 106 MMHG | HEART RATE: 69 BPM | HEIGHT: 65 IN | BODY MASS INDEX: 29.02 KG/M2 | TEMPERATURE: 97.9 F | RESPIRATION RATE: 18 BRPM | WEIGHT: 174.16 LBS | OXYGEN SATURATION: 95 % | DIASTOLIC BLOOD PRESSURE: 68 MMHG

## 2024-05-26 LAB
ERYTHROCYTE [DISTWIDTH] IN BLOOD BY AUTOMATED COUNT: 14.3 % (ref 11.5–14.5)
HCT VFR BLD AUTO: 31.9 % (ref 36–46)
HGB BLD-MCNC: 10.6 G/DL (ref 12–16)
MCH RBC QN AUTO: 31.5 PG (ref 26–34)
MCHC RBC AUTO-ENTMCNC: 33.2 G/DL (ref 32–36)
MCV RBC AUTO: 95 FL (ref 80–100)
NRBC BLD-RTO: 0 /100 WBCS (ref 0–0)
PLATELET # BLD AUTO: 190 X10*3/UL (ref 150–450)
RBC # BLD AUTO: 3.37 X10*6/UL (ref 4–5.2)
WBC # BLD AUTO: 8.9 X10*3/UL (ref 4.4–11.3)

## 2024-05-26 PROCEDURE — 2500000001 HC RX 250 WO HCPCS SELF ADMINISTERED DRUGS (ALT 637 FOR MEDICARE OP): Performed by: STUDENT IN AN ORGANIZED HEALTH CARE EDUCATION/TRAINING PROGRAM

## 2024-05-26 PROCEDURE — 85027 COMPLETE CBC AUTOMATED: CPT | Performed by: NURSE PRACTITIONER

## 2024-05-26 PROCEDURE — 36415 COLL VENOUS BLD VENIPUNCTURE: CPT | Performed by: NURSE PRACTITIONER

## 2024-05-26 PROCEDURE — 2500000004 HC RX 250 GENERAL PHARMACY W/ HCPCS (ALT 636 FOR OP/ED): Performed by: STUDENT IN AN ORGANIZED HEALTH CARE EDUCATION/TRAINING PROGRAM

## 2024-05-26 PROCEDURE — 2500000005 HC RX 250 GENERAL PHARMACY W/O HCPCS: Performed by: STUDENT IN AN ORGANIZED HEALTH CARE EDUCATION/TRAINING PROGRAM

## 2024-05-26 PROCEDURE — 2500000004 HC RX 250 GENERAL PHARMACY W/ HCPCS (ALT 636 FOR OP/ED): Performed by: NURSE PRACTITIONER

## 2024-05-26 RX ORDER — MEDROXYPROGESTERONE ACETATE 150 MG/ML
150 INJECTION, SUSPENSION INTRAMUSCULAR ONCE AS NEEDED
Status: COMPLETED | OUTPATIENT
Start: 2024-05-26 | End: 2024-05-26

## 2024-05-26 RX ORDER — DOCUSATE SODIUM 100 MG/1
100 CAPSULE, LIQUID FILLED ORAL 2 TIMES DAILY PRN
Qty: 60 CAPSULE | Refills: 0 | Status: SHIPPED | OUTPATIENT
Start: 2024-05-26 | End: 2024-06-25

## 2024-05-26 RX ORDER — IBUPROFEN 600 MG/1
600 TABLET ORAL EVERY 6 HOURS PRN
Qty: 120 TABLET | Refills: 0 | Status: SHIPPED | OUTPATIENT
Start: 2024-05-26 | End: 2024-06-25

## 2024-05-26 RX ORDER — OXYCODONE HYDROCHLORIDE 5 MG/1
5 TABLET ORAL EVERY 6 HOURS PRN
Qty: 5 TABLET | Refills: 0 | Status: SHIPPED | OUTPATIENT
Start: 2024-05-26 | End: 2024-05-31

## 2024-05-26 RX ORDER — NALOXONE HYDROCHLORIDE 4 MG/.1ML
4 SPRAY NASAL AS NEEDED
Qty: 1 EACH | Refills: 1 | Status: SHIPPED | OUTPATIENT
Start: 2024-05-26

## 2024-05-26 RX ADMIN — IBUPROFEN 600 MG: 600 TABLET, FILM COATED ORAL at 09:01

## 2024-05-26 RX ADMIN — MEDROXYPROGESTERONE ACETATE 150 MG: 150 INJECTION, SUSPENSION INTRAMUSCULAR at 13:47

## 2024-05-26 RX ADMIN — IBUPROFEN 600 MG: 600 TABLET, FILM COATED ORAL at 03:08

## 2024-05-26 RX ADMIN — ENOXAPARIN SODIUM 40 MG: 100 INJECTION SUBCUTANEOUS at 09:01

## 2024-05-26 RX ADMIN — LIDOCAINE 1 PATCH: 4 PATCH TOPICAL at 06:56

## 2024-05-26 RX ADMIN — OXYCODONE HYDROCHLORIDE 5 MG: 5 TABLET ORAL at 09:01

## 2024-05-26 RX ADMIN — FAMOTIDINE 20 MG: 20 TABLET, FILM COATED ORAL at 03:07

## 2024-05-26 ASSESSMENT — PAIN DESCRIPTION - LOCATION: LOCATION: ABDOMEN

## 2024-05-26 ASSESSMENT — PAIN - FUNCTIONAL ASSESSMENT: PAIN_FUNCTIONAL_ASSESSMENT: 0-10

## 2024-05-26 ASSESSMENT — PAIN SCALES - GENERAL
PAINLEVEL_OUTOF10: 7
PAINLEVEL_OUTOF10: 8

## 2024-05-26 ASSESSMENT — PAIN DESCRIPTION - DESCRIPTORS: DESCRIPTORS: CRAMPING;DISCOMFORT;SORE

## 2024-05-26 NOTE — CARE PLAN
The patient's goals for the shift include   Problem: Postpartum  Goal: Experiences normal postpartum course  Outcome: Met  Goal: Establish and maintain infant feeding pattern for adequate nutrition  Outcome: Met     Problem: Safety - Adult  Goal: Free from fall injury  Outcome: Met         VSS, bleeding and swelling minimal, pain controlled with medications, IV removed, incision infection free, breastfeeding/pumping/formula feeding.

## 2024-05-26 NOTE — DISCHARGE SUMMARY
Discharge Summary    Admission Date: 2024  Discharge Date: 24  Discharge Diagnosis: Normal labor ([facility])     Patient Active Problem List   Diagnosis    Major depressive disorder    Nonsustained ventricular tachycardia (Multi)    Suicide attempt (Multi)    Rh negative status during pregnancy in first trimester ([facility])    Generalized anxiety disorder    Cannabis abuse, episodic    Encounter for supervision of normal pregnancy in third trimester ([facility])    Pyelectasis of fetus on prenatal ultrasound ([facility])    Positive GBS test    Normal labor ([facility])     delivery delivered ([facility])       Hospital Course  Levi BeeElizabethLeah is a 18 y.o.,     Initially presented for: labor, had CS for arrest of descent    Admission Date: 2024    Delivery Date: 2024  2:08 PM     Delivery type: , Low Transverse      GA at delivery: 37w5d    Outcome: Living     Anesthesia during delivery: Epidural     Intrapartum complications: Failure to Progress in Second Stage     Feeding method: Breastfeeding Status: Yes    Contraception: Depo-Provera    Rhogam: The patient's blood type is O NEG. The baby's blood type is O POS . Rhogam indicated and given.     Now postpartum day: 3.    Hospital course n/f:  Elevated BP without diagnosis of HDP  - Mild range BPs <4 hrs apart, severe range BP in s/o arm movement  - Normotensive today  - Baseline HELLP labs negative  - Signs and symptoms of PEC reviewed     H/o Ventricular tachycardia, nonsustained  - During hospitalization in s/o tylenol overdose in 2022  - Holter negative, TTE WNL  - Denies palpitations     Maternal Well-Being  - emotional support provided  - bonding with infant  - MDD, h/o suicide attempts via overdose, last 2022: lexapro rx'd but not taking; follows with therapist; Denies SI/HI; SW consult placed    CBC repeated today d/t inability to palpate fundus 2/2 to patient intolerance of exam. Hg stable, WBC decreased. No c/f  concealed bleed or developing infection.    PP course otherwise uneventful.  Meeting all postpartum milestones- ambulating independently, passing flatus, tolerating PO intake, lochia light, voiding spontaneously, and pain well controlled with PO meds.     Dispo  OK for DC today  2 week incision check and 6wk postpartum follow-up.     Pertinent Physical Exam At Time of Discharge  General: well appearing, well nourished, postpartum  Obstetric: unable to palpate fundus 2/2 pain, lochia light  Skin: Warm, dry; no rashes/lesions/erythema; Pfannenstiel incision: clean, dry, well approximated, open to air, negative discharge/warmth/erythema   Breast: No masses, nipple discharge  Neuro: A/Ox3, conversational, no gross motor deficit   GI: mild distension, appropriately tender, soft, +BS  Respiratory: Even and unlabored on RA, LSCTA BL  Cardiovascular: No BLE edema; No erythema, warmth  Psych: appropriate mood and affect       Your medication list        START taking these medications        Instructions Last Dose Given Next Dose Due   docusate sodium 100 mg capsule  Commonly known as: Colace      Take 1 capsule (100 mg) by mouth 2 times a day as needed for constipation.       ibuprofen 600 mg tablet      Take 1 tablet (600 mg) by mouth every 6 hours if needed for moderate pain (4 - 6) (pain).       naloxone 4 mg/0.1 mL nasal spray  Commonly known as: Narcan      Administer 1 spray (4 mg) into affected nostril(s) if needed for opioid reversal or respiratory depression. May repeat every 2-3 minutes if needed, alternating nostrils, until medical assistance becomes available.       oxyCODONE 5 mg immediate release tablet  Commonly known as: Roxicodone      Take 1 tablet (5 mg) by mouth every 6 hours if needed for severe pain (7 - 10) for up to 5 days.              CONTINUE taking these medications        Instructions Last Dose Given Next Dose Due   famotidine 20 mg tablet  Commonly known as: Pepcid      Take 1 tablet (20 mg) by  mouth 2 times a day.       Prenatal Vitamin 27 mg iron- 0.8 mg tablet  Generic drug: prenatal vitamin (iron-folic)      TAKE 1 TABLET BY MOUTH EVERY DAY              STOP taking these medications      metoclopramide 10 mg tablet  Commonly known as: Reglan                  Where to Get Your Medications        These medications were sent to [facility]/pharmacy #[id number] - [place], [place] - [address]  [address], [place] [address]      Hours: 24-hours Phone: [phone]   docusate sodium 100 mg capsule  ibuprofen 600 mg tablet  naloxone 4 mg/0.1 mL nasal spray  oxyCODONE 5 mg immediate release tablet           Outpatient Follow-Up  No future appointments.    Romana Petit, APRN-CNP

## 2024-06-07 ENCOUNTER — POSTPARTUM VISIT (OUTPATIENT)
Dept: OBSTETRICS AND GYNECOLOGY | Facility: CLINIC | Age: 19
End: 2024-06-07
Payer: COMMERCIAL

## 2024-06-07 VITALS
SYSTOLIC BLOOD PRESSURE: 117 MMHG | DIASTOLIC BLOOD PRESSURE: 79 MMHG | BODY MASS INDEX: 25.14 KG/M2 | HEART RATE: 96 BPM | HEIGHT: 65 IN | WEIGHT: 150.9 LBS

## 2024-06-07 DIAGNOSIS — F41.1 GENERALIZED ANXIETY DISORDER: ICD-10-CM

## 2024-06-07 DIAGNOSIS — I47.29 NONSUSTAINED VENTRICULAR TACHYCARDIA (MULTI): Primary | ICD-10-CM

## 2024-06-07 DIAGNOSIS — F33.9 EPISODE OF RECURRENT MAJOR DEPRESSIVE DISORDER, UNSPECIFIED DEPRESSION EPISODE SEVERITY (CMS-HCC): Chronic | ICD-10-CM

## 2024-06-07 PROBLEM — T14.91XA SUICIDE ATTEMPT (MULTI): Status: RESOLVED | Noted: 2023-10-04 | Resolved: 2024-06-07

## 2024-06-07 PROBLEM — O35.EXX0 PYELECTASIS OF FETUS ON PRENATAL ULTRASOUND (HHS-HCC): Status: RESOLVED | Noted: 2024-05-02 | Resolved: 2024-06-07

## 2024-06-07 PROBLEM — Z37.9 NORMAL LABOR (HHS-HCC): Status: RESOLVED | Noted: 2024-05-22 | Resolved: 2024-06-07

## 2024-06-07 PROBLEM — Z34.93 ENCOUNTER FOR SUPERVISION OF NORMAL PREGNANCY IN THIRD TRIMESTER (HHS-HCC): Status: RESOLVED | Noted: 2024-03-21 | Resolved: 2024-06-07

## 2024-06-07 RX ORDER — ESCITALOPRAM OXALATE 20 MG/1
20 TABLET ORAL DAILY
Qty: 30 TABLET | Refills: 2 | Status: SHIPPED | OUTPATIENT
Start: 2024-06-07

## 2024-06-07 RX ORDER — ESCITALOPRAM OXALATE 20 MG/1
20 TABLET ORAL DAILY
COMMUNITY
End: 2024-06-07 | Stop reason: SDUPTHER

## 2024-06-07 ASSESSMENT — EDINBURGH POSTNATAL DEPRESSION SCALE (EPDS)
I HAVE FELT SCARED OR PANICKY FOR NO GOOD REASON: NO, NOT AT ALL
TOTAL SCORE: 6
I HAVE BEEN ANXIOUS OR WORRIED FOR NO GOOD REASON: NO, NOT AT ALL
I HAVE BEEN SO UNHAPPY THAT I HAVE BEEN CRYING: YES, QUITE OFTEN
I HAVE FELT SAD OR MISERABLE: YES, QUITE OFTEN
I HAVE BLAMED MYSELF UNNECESSARILY WHEN THINGS WENT WRONG: YES, SOME OF THE TIME
I HAVE LOOKED FORWARD WITH ENJOYMENT TO THINGS: AS MUCH AS I EVER DID
I HAVE BEEN ABLE TO LAUGH AND SEE THE FUNNY SIDE OF THINGS: AS MUCH AS I ALWAYS COULD
THE THOUGHT OF HARMING MYSELF HAS OCCURRED TO ME: NEVER
THINGS HAVE BEEN GETTING ON TOP OF ME: NO, I HAVE BEEN COPING AS WELL AS EVER
I HAVE BEEN SO UNHAPPY THAT I HAVE HAD DIFFICULTY SLEEPING: NOT AT ALL

## 2024-06-07 ASSESSMENT — PATIENT HEALTH QUESTIONNAIRE - PHQ9
1. LITTLE INTEREST OR PLEASURE IN DOING THINGS: NOT AT ALL
SUM OF ALL RESPONSES TO PHQ9 QUESTIONS 1 AND 2: 0
2. FEELING DOWN, DEPRESSED OR HOPELESS: NOT AT ALL

## 2024-06-07 ASSESSMENT — ENCOUNTER SYMPTOMS
CARDIOVASCULAR NEGATIVE: 0
ENDOCRINE NEGATIVE: 0
PSYCHIATRIC NEGATIVE: 0
MUSCULOSKELETAL NEGATIVE: 0
EYES NEGATIVE: 0
CONSTITUTIONAL NEGATIVE: 0
NEUROLOGICAL NEGATIVE: 0
HEMATOLOGIC/LYMPHATIC NEGATIVE: 0
RESPIRATORY NEGATIVE: 0
ALLERGIC/IMMUNOLOGIC NEGATIVE: 0
GASTROINTESTINAL NEGATIVE: 0

## 2024-06-07 ASSESSMENT — PAIN SCALES - GENERAL: PAINLEVEL: 5

## 2024-06-07 NOTE — ASSESSMENT & PLAN NOTE
-Previously Rx Lexapro, not currently taking  -Follows with [facility] for therapy  -Known hx of suicide attempt in 2022, mood okay today and denies SI/HI/ or AVH concerning for threat of harm to herself or the baby.   -Rx for Lexapro re-started today  -RTC 2 weeks for follow up/check-in once medication re-started

## 2024-06-07 NOTE — PROGRESS NOTES
"POSTPARTUM VISIT NOTE   2024, 4:59 PM     SUBJECTIVE: Pt maureen 19yo  now 2 weeks Post op from a pCS for AOD. She is doing well today. Bleeding is mibnimal. Pain is well controlled with PRN motrin and tylenol. . Reports no problems with her bowels or bladder. Incision site with some numbness and soreness but otherwise feeling okay. She has Depo for BCM. She has not had intercourse. Baby is doing well. She is bottle and breast  feeding.  Mood has been \"up and down\" since delivery. Seen by [facility] earlier this week with plan to restart lexapro. No SI/HI or AVH    OBJECTIVE:   /79   Pulse 96   Ht 1.651 m (5' 5\")   Wt 68.4 kg (150 lb 14.4 oz)   LMP 2024 (Approximate)   Breastfeeding Yes   BMI 25.11 kg/m²    @FLOWSTAT(6,8,5,[id number]:24::1)@    General:  AAOx3, No acute distress  Cardiovascular: Warm and well perfused  Respiratory: Normal respiratory effort   Abdominal:  Soft, non-tender, no rebound or guarding, Pfannenstiel incision healing well.  Extremities: Warm, well perfused, no edema, no calf tenderness     ASSESSMENT AND PLAN:     18 y.o.  Presenting for 2wk PPV.  Pt seen and discussed with Dr. French     delivery delivered ([facility])  -Doing well postpartum, pain well controlled  -Encouraged abdominal binder and PRN motrin tylenol for ongoing post op pain control  -Continue routine postpartum care    Major depressive disorder  -Previously Rx Lexapro, not currently taking  -Follows with [facility] for therapy  -Known hx of suicide attempt in , mood okay today and denies SI/HI/ or AVH concerning for threat of harm to herself or the baby.   -Rx for Lexapro re-started today  -RTC 2 weeks for follow up/check-in once medication re-started    Generalized anxiety disorder  -Lexapro rx re-sent  -Mood okay    Nonsustained ventricular tachycardia (Multi)  -No acute concerns today. Continue to monitor    D/w Dr. Manolo Ram MD --PGY-3      Active " Problems:  There are no active Hospital Problems.

## 2024-06-07 NOTE — ASSESSMENT & PLAN NOTE
-Doing well postpartum, pain well controlled  -Encouraged abdominal binder and PRN motrin tylenol for ongoing post op pain control  -Continue routine postpartum care

## 2024-06-28 ENCOUNTER — PHARMACY VISIT (OUTPATIENT)
Dept: PHARMACY | Facility: CLINIC | Age: 19
End: 2024-06-28
Payer: MEDICAID

## 2024-06-28 ENCOUNTER — OFFICE VISIT (OUTPATIENT)
Dept: OBSTETRICS AND GYNECOLOGY | Facility: CLINIC | Age: 19
End: 2024-06-28
Payer: COMMERCIAL

## 2024-06-28 VITALS
SYSTOLIC BLOOD PRESSURE: 104 MMHG | HEART RATE: 80 BPM | WEIGHT: 157.1 LBS | DIASTOLIC BLOOD PRESSURE: 70 MMHG | BODY MASS INDEX: 26.14 KG/M2

## 2024-06-28 DIAGNOSIS — F41.1 GENERALIZED ANXIETY DISORDER: ICD-10-CM

## 2024-06-28 PROCEDURE — 1036F TOBACCO NON-USER: CPT | Performed by: STUDENT IN AN ORGANIZED HEALTH CARE EDUCATION/TRAINING PROGRAM

## 2024-06-28 PROCEDURE — 99214 OFFICE O/P EST MOD 30 MIN: CPT | Performed by: STUDENT IN AN ORGANIZED HEALTH CARE EDUCATION/TRAINING PROGRAM

## 2024-06-28 PROCEDURE — RXMED WILLOW AMBULATORY MEDICATION CHARGE

## 2024-06-28 PROCEDURE — 99214 OFFICE O/P EST MOD 30 MIN: CPT | Mod: GC | Performed by: STUDENT IN AN ORGANIZED HEALTH CARE EDUCATION/TRAINING PROGRAM

## 2024-06-28 RX ORDER — ESCITALOPRAM OXALATE 20 MG/1
20 TABLET ORAL DAILY
Qty: 30 TABLET | Refills: 2 | Status: SHIPPED | OUTPATIENT
Start: 2024-06-28

## 2024-06-28 ASSESSMENT — EDINBURGH POSTNATAL DEPRESSION SCALE (EPDS)
THE THOUGHT OF HARMING MYSELF HAS OCCURRED TO ME: NEVER
I HAVE BLAMED MYSELF UNNECESSARILY WHEN THINGS WENT WRONG: YES, SOME OF THE TIME
I HAVE BEEN SO UNHAPPY THAT I HAVE BEEN CRYING: YES, MOST OF THE TIME
I HAVE FELT SCARED OR PANICKY FOR NO GOOD REASON: NO, NOT MUCH
I HAVE BEEN SO UNHAPPY THAT I HAVE HAD DIFFICULTY SLEEPING: NOT VERY OFTEN
I HAVE BEEN ANXIOUS OR WORRIED FOR NO GOOD REASON: YES, SOMETIMES
I HAVE BEEN ABLE TO LAUGH AND SEE THE FUNNY SIDE OF THINGS: AS MUCH AS I ALWAYS COULD
THINGS HAVE BEEN GETTING ON TOP OF ME: YES, SOMETIMES I HAVEN'T BEEN COPING AS WELL AS USUAL
TOTAL SCORE: 14
I HAVE LOOKED FORWARD WITH ENJOYMENT TO THINGS: AS MUCH AS I EVER DID
I HAVE FELT SAD OR MISERABLE: YES, MOST OF THE TIME

## 2024-06-28 NOTE — ASSESSMENT & PLAN NOTE
-Previously Rx Lexapro, not able to  rx last visit, re-ordered today  -Follows with [facility] for therapy, encouraged to continue  -Known hx of suicide attempt in 2022, mood okay today and denies SI/HI/ or AVH concerning for threat of harm to herself or the baby.   -RTC 3 weeks for follow up

## 2024-06-28 NOTE — PROGRESS NOTES
POSTPARTUM VISIT  2024, 12:13 PM     SUBJECTIVE:  Patient doing ok in the postpartum period. Mood has not improved since the last visit as she has not been able to receive her medication for depression at the last pharmacy. Resent to  RBC today. Denies HI/SI or AVH. Baby doing well. States she feels anxiety about FOB getting out of [employer] soon and his desire for joint custody. Has no desire to get back into a relationship with him at this time. Physically doing well. Incision site healing well with some spotting since getting her depo.       OBJECTIVE:   /70   Pulse 80   Wt 71.3 kg (157 lb 1.6 oz)   LMP 2024 (Approximate)   Breastfeeding Yes Comment: sometimes  BMI 26.14 kg/m²    @FLOWSTAT(6,8,5,[id number]:24::1)@    General:  AAOx3, No acute distress  Cardiovascular: Warm and well perfused  Respiratory: Normal respiratory effort   Abdominal:  Soft, pfannenstiel incsion c/d/i  Extremities: Warm, well perfused, no edema, no calf tenderness   Pelvic:       Labs:   Labs in chart were reviewed.    ASSESSMENT AND PLAN:     18 y.o.  s/p pCS on  presenting for medication follow up visit in the setting of postpartum depression.     delivery delivered ([facility])  -Doing well postpartum, pain well controlled  -Encouraged abdominal binder and PRN motrin tylenol for ongoing post op pain control  -Continue routine postpartum care    Major depressive disorder  -Previously Rx Lexapro, not able to  rx last visit, re-ordered today  -Follows with [facility] for therapy, encouraged to continue  -Known hx of suicide attempt in , mood okay today and denies SI/HI/ or AVH concerning for threat of harm to herself or the baby.   -RTC 3 weeks for follow up    Generalized anxiety disorder  -Lexapro rx re-sent as above         RTC 3 weeks for PPV and check in.    Pt seen and discussed with Dr. Mart Ram MD       Active Problems:  There are no active Hospital Problems.

## 2024-06-29 NOTE — PROGRESS NOTES
I saw and evaluated the patient. I personally obtained the key and critical portions of the history and physical exam or was physically present for key and critical portions performed by the resident/fellow. I reviewed the resident/fellow's documentation and discussed the patient with the resident/fellow. I agree with the resident/fellow's medical decision making as documented in the note.    Radha Cevallos MD

## 2024-07-30 ENCOUNTER — APPOINTMENT (OUTPATIENT)
Dept: OBSTETRICS AND GYNECOLOGY | Facility: CLINIC | Age: 19
End: 2024-07-30
Payer: COMMERCIAL

## 2024-08-12 NOTE — PROGRESS NOTES
I saw and evaluated the patient. I personally obtained the key and critical portions of the history and physical exam or was physically present for key and critical portions performed by the resident/fellow. I reviewed the resident/fellow's documentation and discussed the patient with the resident/fellow. I agree with the resident/fellow's medical decision making as documented in the note.    Trisha French MD

## 2024-08-16 ENCOUNTER — CLINICAL SUPPORT (OUTPATIENT)
Dept: OBSTETRICS AND GYNECOLOGY | Facility: CLINIC | Age: 19
End: 2024-08-16
Payer: COMMERCIAL

## 2024-08-16 ENCOUNTER — APPOINTMENT (OUTPATIENT)
Dept: LAB | Facility: LAB | Age: 19
End: 2024-08-16
Payer: COMMERCIAL

## 2024-08-16 ENCOUNTER — LAB (OUTPATIENT)
Dept: LAB | Facility: LAB | Age: 19
End: 2024-08-16
Payer: COMMERCIAL

## 2024-08-16 DIAGNOSIS — Z30.42 ENCOUNTER FOR DEPO-PROVERA CONTRACEPTION: Primary | ICD-10-CM

## 2024-08-16 DIAGNOSIS — Z11.3 ROUTINE SCREENING FOR STI (SEXUALLY TRANSMITTED INFECTION): ICD-10-CM

## 2024-08-16 LAB
HBV SURFACE AG SERPL QL IA: NONREACTIVE
HCV AB SER QL: NONREACTIVE
HIV 1+2 AB+HIV1 P24 AG SERPL QL IA: NONREACTIVE
PREGNANCY TEST URINE, POC: NEGATIVE
TREPONEMA PALLIDUM IGG+IGM AB [PRESENCE] IN SERUM OR PLASMA BY IMMUNOASSAY: NONREACTIVE

## 2024-08-16 PROCEDURE — 96372 THER/PROPH/DIAG INJ SC/IM: CPT | Performed by: OBSTETRICS & GYNECOLOGY

## 2024-08-16 PROCEDURE — 81025 URINE PREGNANCY TEST: CPT

## 2024-08-16 PROCEDURE — 81025 URINE PREGNANCY TEST: CPT | Performed by: OBSTETRICS & GYNECOLOGY

## 2024-08-16 PROCEDURE — 87340 HEPATITIS B SURFACE AG IA: CPT

## 2024-08-16 PROCEDURE — 87491 CHLMYD TRACH DNA AMP PROBE: CPT

## 2024-08-16 PROCEDURE — 87389 HIV-1 AG W/HIV-1&-2 AB AG IA: CPT

## 2024-08-16 PROCEDURE — 86803 HEPATITIS C AB TEST: CPT

## 2024-08-16 PROCEDURE — 86780 TREPONEMA PALLIDUM: CPT

## 2024-08-16 PROCEDURE — 87661 TRICHOMONAS VAGINALIS AMPLIF: CPT

## 2024-08-16 PROCEDURE — 2500000004 HC RX 250 GENERAL PHARMACY W/ HCPCS (ALT 636 FOR OP/ED): Mod: SE | Performed by: OBSTETRICS & GYNECOLOGY

## 2024-08-16 RX ORDER — MEDROXYPROGESTERONE ACETATE 150 MG/ML
150 INJECTION, SUSPENSION INTRAMUSCULAR ONCE
Status: COMPLETED | OUTPATIENT
Start: 2024-08-16 | End: 2024-08-16

## 2024-08-16 ASSESSMENT — PATIENT HEALTH QUESTIONNAIRE - PHQ9
SUM OF ALL RESPONSES TO PHQ9 QUESTIONS 1 AND 2: 0
2. FEELING DOWN, DEPRESSED OR HOPELESS: NOT AT ALL
1. LITTLE INTEREST OR PLEASURE IN DOING THINGS: NOT AT ALL

## 2024-08-16 NOTE — PROGRESS NOTES
Patient here for Depo injection.  Last Depo: 5/26/24  Last Annual: 6/7/24  RN discussed Depo-Provera side effects (irregular bleeding).  Depo given IM into right deltoid. Depo supplied by office.   Patient tolerated well.  Patient requesting pregnancy test and STI testing.  In office pregnancy test negative.  Urine collected   Patient scheduled to RTC 11/1/24 for depo.   Patient verbalized understanding and all questions were answered.

## 2024-08-20 ENCOUNTER — OFFICE VISIT (OUTPATIENT)
Dept: OBSTETRICS AND GYNECOLOGY | Facility: CLINIC | Age: 19
End: 2024-08-20
Payer: COMMERCIAL

## 2024-08-20 VITALS
WEIGHT: 148.6 LBS | HEIGHT: 65 IN | HEART RATE: 97 BPM | SYSTOLIC BLOOD PRESSURE: 120 MMHG | DIASTOLIC BLOOD PRESSURE: 75 MMHG | BODY MASS INDEX: 24.76 KG/M2

## 2024-08-20 PROCEDURE — 1036F TOBACCO NON-USER: CPT

## 2024-08-20 PROCEDURE — 99024 POSTOP FOLLOW-UP VISIT: CPT | Performed by: OBSTETRICS & GYNECOLOGY

## 2024-08-20 ASSESSMENT — ENCOUNTER SYMPTOMS
NEUROLOGICAL NEGATIVE: 0
PSYCHIATRIC NEGATIVE: 0
ALLERGIC/IMMUNOLOGIC NEGATIVE: 0
RESPIRATORY NEGATIVE: 0
GASTROINTESTINAL NEGATIVE: 0
ENDOCRINE NEGATIVE: 0
HEMATOLOGIC/LYMPHATIC NEGATIVE: 0
EYES NEGATIVE: 0
MUSCULOSKELETAL NEGATIVE: 0
CARDIOVASCULAR NEGATIVE: 0
CONSTITUTIONAL NEGATIVE: 0

## 2024-08-20 ASSESSMENT — PATIENT HEALTH QUESTIONNAIRE - PHQ9
SUM OF ALL RESPONSES TO PHQ9 QUESTIONS 1 AND 2: 2
10. IF YOU CHECKED OFF ANY PROBLEMS, HOW DIFFICULT HAVE THESE PROBLEMS MADE IT FOR YOU TO DO YOUR WORK, TAKE CARE OF THINGS AT HOME, OR GET ALONG WITH OTHER PEOPLE: NOT DIFFICULT AT ALL
1. LITTLE INTEREST OR PLEASURE IN DOING THINGS: SEVERAL DAYS
2. FEELING DOWN, DEPRESSED OR HOPELESS: SEVERAL DAYS

## 2024-08-20 ASSESSMENT — EDINBURGH POSTNATAL DEPRESSION SCALE (EPDS)
TOTAL SCORE: 11
I HAVE BEEN SO UNHAPPY THAT I HAVE HAD DIFFICULTY SLEEPING: NOT AT ALL
I HAVE FELT SCARED OR PANICKY FOR NO GOOD REASON: YES, SOMETIMES
I HAVE BEEN ABLE TO LAUGH AND SEE THE FUNNY SIDE OF THINGS: AS MUCH AS I ALWAYS COULD
I HAVE LOOKED FORWARD WITH ENJOYMENT TO THINGS: AS MUCH AS I EVER DID
THINGS HAVE BEEN GETTING ON TOP OF ME: YES, SOMETIMES I HAVEN'T BEEN COPING AS WELL AS USUAL
THE THOUGHT OF HARMING MYSELF HAS OCCURRED TO ME: NEVER
I HAVE BLAMED MYSELF UNNECESSARILY WHEN THINGS WENT WRONG: YES, SOME OF THE TIME
I HAVE BEEN ANXIOUS OR WORRIED FOR NO GOOD REASON: YES, SOMETIMES
I HAVE FELT SAD OR MISERABLE: YES, QUITE OFTEN
I HAVE BEEN SO UNHAPPY THAT I HAVE BEEN CRYING: ONLY OCCASIONALLY

## 2024-08-20 ASSESSMENT — COLUMBIA-SUICIDE SEVERITY RATING SCALE - C-SSRS
6. HAVE YOU EVER DONE ANYTHING, STARTED TO DO ANYTHING, OR PREPARED TO DO ANYTHING TO END YOUR LIFE?: NO
2. HAVE YOU ACTUALLY HAD ANY THOUGHTS OF KILLING YOURSELF?: NO
1. IN THE PAST MONTH, HAVE YOU WISHED YOU WERE DEAD OR WISHED YOU COULD GO TO SLEEP AND NOT WAKE UP?: NO

## 2024-08-20 ASSESSMENT — PAIN SCALES - GENERAL: PAINLEVEL: 0-NO PAIN

## 2024-08-20 NOTE — PROGRESS NOTES
"Subjective   31 y.o.  presenting for postpartum follow-up, s/p pLTCS due to arrest of decent on     Concerns:   Right \"achy pain\" on  incision; worse with movement. Was most painful after , but has gradually gotten better since then. No fevers, no discharge.    Mood shaky sometimes in a down mood for a little while. Feels down for a few hours-days, felt more after birth. Before pregnancy, episodes were more persistent. No thoughts of harming self/others/baby. Is currently seeing a counselor. Was taking Lexapro for week however decided to self discontinue and interested in starting with counseling instead    Mood: EPDS Score 11  Bleeding: None  Contraceptive Method: Depot  Feeding Method: Bottle & breast feeding    Objective   /75   Pulse 97   Ht 1.651 m (5' 5\")   Wt 67.4 kg (148 lb 9.6 oz)      General: no acute distress  HEENT: normocephalic, atraumatic  Heart: warm and well perfused  Lungs: breathing comfortably on room air  Extremities: moving all extremities  Neuro: awake and conversant  Psych: appropriate mood and affect    Assessment    delivery delivered ([facility])  -Doing well postpartum  -Incision healing well on exam  -Suspect normal postoperative pain on the right side, recommend Tylenol and ibuprofen prn     Major depressive disorder  -Previously taking Lexapro, patient self discontinued   -Follows with counselor  -Known hx of suicide attempt in , mood okay today and denies SI/HI/ or AVH concerning for threat of harm to herself or the baby.   -RTC 3 months for follow up     Generalized anxiety disorder  -Patient following with counselor as above  -Declines medication at this time      "

## 2024-09-05 ENCOUNTER — APPOINTMENT (OUTPATIENT)
Dept: PRIMARY CARE | Facility: CLINIC | Age: 19
End: 2024-09-05
Payer: COMMERCIAL

## 2024-10-01 ENCOUNTER — TELEPHONE (OUTPATIENT)
Dept: PEDIATRICS | Facility: CLINIC | Age: 19
End: 2024-10-01
Payer: COMMERCIAL

## 2024-10-01 NOTE — TELEPHONE ENCOUNTER
Copied from CRM #[id number]. Topic: Information Request - Trying to reach PCP  >> Sep 25, 2024  1:59 PM Hung LOMELI wrote:  Pt is requesting a callback regarding medical questions. Thank you

## 2024-10-18 ENCOUNTER — TELEPHONE (OUTPATIENT)
Dept: PEDIATRICS | Facility: CLINIC | Age: 19
End: 2024-10-18
Payer: COMMERCIAL

## 2024-10-18 NOTE — TELEPHONE ENCOUNTER
Left message.  Explained unsure if MD will sign the paperwork, but would need to say why she doesn't want it.  Explained paperwork policy.  Also, suggested she can take it to her OB/GYN when she sees them.

## 2024-10-18 NOTE — TELEPHONE ENCOUNTER
Copied from CRM #[id number]. Topic: Information Request - Doctor, Hospital, or Provider  >> Oct 10, 2024 10:50 AM Adela DUARTE wrote:  Patient wants to be contacted by office to know if her MD can sign a flu shot exemption paper for her.

## 2024-11-05 ENCOUNTER — CLINICAL SUPPORT (OUTPATIENT)
Dept: OBSTETRICS AND GYNECOLOGY | Facility: CLINIC | Age: 19
End: 2024-11-05
Payer: COMMERCIAL

## 2024-11-05 DIAGNOSIS — Z30.42 DEPO-PROVERA CONTRACEPTIVE STATUS: Primary | ICD-10-CM

## 2024-11-05 PROCEDURE — 96372 THER/PROPH/DIAG INJ SC/IM: CPT | Performed by: OBSTETRICS & GYNECOLOGY

## 2024-11-05 PROCEDURE — 2500000004 HC RX 250 GENERAL PHARMACY W/ HCPCS (ALT 636 FOR OP/ED): Mod: SE | Performed by: OBSTETRICS & GYNECOLOGY

## 2024-11-05 RX ORDER — MEDROXYPROGESTERONE ACETATE 150 MG/ML
150 INJECTION, SUSPENSION INTRAMUSCULAR ONCE
Status: COMPLETED | OUTPATIENT
Start: 2024-11-05 | End: 2024-11-05

## 2024-11-05 NOTE — PROGRESS NOTES
Patient here for Depo injection.  Last Depo: 8/16/2025   discussed Depo-Provera side effects ,calcium.  Depo given IM into right DELTOID. Depo supplied by office. Patient tolerated well.  Patient to RTC between 1/21/2025 to 2/4/2025 for depo.  Patient verbalized understanding and all questions were answered.

## 2024-11-19 ENCOUNTER — OFFICE VISIT (OUTPATIENT)
Dept: PEDIATRICS | Facility: CLINIC | Age: 19
End: 2024-11-19
Payer: COMMERCIAL

## 2024-11-19 VITALS
HEART RATE: 88 BPM | SYSTOLIC BLOOD PRESSURE: 117 MMHG | HEIGHT: 65 IN | RESPIRATION RATE: 18 BRPM | DIASTOLIC BLOOD PRESSURE: 68 MMHG | WEIGHT: 136.69 LBS | BODY MASS INDEX: 22.77 KG/M2 | TEMPERATURE: 97.5 F

## 2024-11-19 DIAGNOSIS — Z32.00 ENCOUNTER FOR PREGNANCY TEST, RESULT UNKNOWN: Primary | ICD-10-CM

## 2024-11-19 LAB — PREGNANCY TEST URINE, POC: NEGATIVE

## 2024-11-19 PROCEDURE — 99213 OFFICE O/P EST LOW 20 MIN: CPT | Mod: GE | Performed by: PEDIATRICS

## 2024-11-19 PROCEDURE — 99213 OFFICE O/P EST LOW 20 MIN: CPT | Performed by: PEDIATRICS

## 2024-11-19 PROCEDURE — 3008F BODY MASS INDEX DOCD: CPT | Performed by: PEDIATRICS

## 2024-11-19 PROCEDURE — 81025 URINE PREGNANCY TEST: CPT | Performed by: PEDIATRICS

## 2024-11-19 ASSESSMENT — ENCOUNTER SYMPTOMS
CONSTITUTIONAL NEGATIVE: 1
CARDIOVASCULAR NEGATIVE: 1
RESPIRATORY NEGATIVE: 1
EYES NEGATIVE: 1
ENDOCRINE NEGATIVE: 1
NEUROLOGICAL NEGATIVE: 1
MUSCULOSKELETAL NEGATIVE: 1
HEMATOLOGIC/LYMPHATIC NEGATIVE: 1
GASTROINTESTINAL NEGATIVE: 1

## 2024-11-19 ASSESSMENT — PAIN SCALES - GENERAL: PAINLEVEL_OUTOF10: 0-NO PAIN
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
## 2024-11-19 NOTE — LETTER
November 19, 2024     Patient: Levi Newberry   YOB: 2005   Date of Visit: 11/19/2024       To Whom It May Concern:    It is my medical opinion that Levi Newberry may return to full duty immediately with no restrictions.    If you have any questions or concerns, please don't hesitate to call.         Sincerely,        Brit Moses MD    CC: No Recipients

## 2025-01-21 ENCOUNTER — CLINICAL SUPPORT (OUTPATIENT)
Dept: OBSTETRICS AND GYNECOLOGY | Facility: CLINIC | Age: 20
End: 2025-01-21
Payer: COMMERCIAL

## 2025-01-21 ENCOUNTER — APPOINTMENT (OUTPATIENT)
Dept: OBSTETRICS AND GYNECOLOGY | Facility: CLINIC | Age: 20
End: 2025-01-21
Payer: COMMERCIAL

## 2025-01-21 DIAGNOSIS — Z30.42 ENCOUNTER FOR DEPO-PROVERA CONTRACEPTION: ICD-10-CM

## 2025-01-21 DIAGNOSIS — Z11.3 SCREEN FOR STD (SEXUALLY TRANSMITTED DISEASE): ICD-10-CM

## 2025-01-21 LAB — PREGNANCY TEST URINE, POC: NEGATIVE

## 2025-01-21 PROCEDURE — 87591 N.GONORRHOEAE DNA AMP PROB: CPT

## 2025-01-21 PROCEDURE — 81025 URINE PREGNANCY TEST: CPT

## 2025-01-21 PROCEDURE — 96372 THER/PROPH/DIAG INJ SC/IM: CPT | Performed by: OBSTETRICS & GYNECOLOGY

## 2025-01-21 PROCEDURE — 87661 TRICHOMONAS VAGINALIS AMPLIF: CPT

## 2025-01-21 PROCEDURE — 81025 URINE PREGNANCY TEST: CPT | Performed by: OBSTETRICS & GYNECOLOGY

## 2025-01-21 PROCEDURE — 2500000004 HC RX 250 GENERAL PHARMACY W/ HCPCS (ALT 636 FOR OP/ED): Mod: SE | Performed by: OBSTETRICS & GYNECOLOGY

## 2025-01-21 RX ORDER — MEDROXYPROGESTERONE ACETATE 150 MG/ML
150 INJECTION, SUSPENSION INTRAMUSCULAR ONCE
Status: COMPLETED | OUTPATIENT
Start: 2025-01-21 | End: 2025-01-21

## 2025-01-21 RX ADMIN — MEDROXYPROGESTERONE ACETATE 150 MG: 150 INJECTION, SUSPENSION INTRAMUSCULAR at 10:40

## 2025-01-21 NOTE — PROGRESS NOTES
Patient here for Depo Provera injection  Expiration of medication 4/30/2027  LMP: unknown  Last Depo Provera: 11/5/2024  Last Annual: scheduled for first PAP test 2/2025  Depo Provera given IM into left deltoid   Depo Provera given from:  office supplied  Patient tolerated well.  Patient to return for next Depo Provera injection between - scheduled  for 4/8/2025    Patient requesting STI testing and UCG

## 2025-01-22 LAB
C TRACH RRNA SPEC QL NAA+PROBE: POSITIVE
N GONORRHOEA DNA SPEC QL PROBE+SIG AMP: NEGATIVE
T VAGINALIS RRNA SPEC QL NAA+PROBE: NEGATIVE

## 2025-01-23 ENCOUNTER — TELEPHONE (OUTPATIENT)
Dept: OBSTETRICS AND GYNECOLOGY | Facility: CLINIC | Age: 20
End: 2025-01-23
Payer: COMMERCIAL

## 2025-01-23 DIAGNOSIS — Z11.3 SCREEN FOR STD (SEXUALLY TRANSMITTED DISEASE): ICD-10-CM

## 2025-01-23 DIAGNOSIS — A74.9 CHLAMYDIA INFECTION: ICD-10-CM

## 2025-01-23 RX ORDER — DOXYCYCLINE 100 MG/1
100 CAPSULE ORAL EVERY 12 HOURS
Qty: 14 CAPSULE | Refills: 0 | Status: SHIPPED | OUTPATIENT
Start: 2025-01-23 | End: 2025-01-30

## 2025-01-23 NOTE — TELEPHONE ENCOUNTER
+CT, gyn pt. Doxycycline per Dr Dunne  TELEPHONE CALL TO PATIENT  Identified by name and date of birth.  Patient notified of result, treatment, partner treatment  EPT offered and sent  Instructed no unprotected sex x 7 days after treatment  Encouraged use of condoms routinely  Patient verbalizes understanding   Offered full STD screening

## 2025-01-23 NOTE — TELEPHONE ENCOUNTER
----- Message from Corinne Bazella sent at 1/22/2025  2:58 PM EST -----  Regarding: can we treat the CT and ask if she wants the rest of the STI screen HIV ect?    ----- Message -----  From: Claudia Tang RN  Sent: 1/21/2025  10:37 AM EST  To: Corinne A Bazella, MD

## 2025-02-06 ENCOUNTER — OFFICE VISIT (OUTPATIENT)
Dept: PEDIATRICS | Facility: CLINIC | Age: 20
End: 2025-02-06
Payer: COMMERCIAL

## 2025-02-06 VITALS
BODY MASS INDEX: 21.71 KG/M2 | SYSTOLIC BLOOD PRESSURE: 103 MMHG | HEIGHT: 65 IN | HEART RATE: 99 BPM | DIASTOLIC BLOOD PRESSURE: 66 MMHG | RESPIRATION RATE: 20 BRPM | TEMPERATURE: 97.3 F | WEIGHT: 130.29 LBS

## 2025-02-06 DIAGNOSIS — Z59.41 FOOD INSECURITY: ICD-10-CM

## 2025-02-06 DIAGNOSIS — A64 STI (SEXUALLY TRANSMITTED INFECTION): ICD-10-CM

## 2025-02-06 DIAGNOSIS — Z00.00 WELL ADULT ON ROUTINE HEALTH CHECK: Primary | ICD-10-CM

## 2025-02-06 DIAGNOSIS — D64.9 ANEMIA, UNSPECIFIED TYPE: ICD-10-CM

## 2025-02-06 PROCEDURE — 99395 PREV VISIT EST AGE 18-39: CPT | Mod: GC | Performed by: PEDIATRICS

## 2025-02-06 PROCEDURE — 3008F BODY MASS INDEX DOCD: CPT | Performed by: PEDIATRICS

## 2025-02-06 PROCEDURE — 2500000001 HC RX 250 WO HCPCS SELF ADMINISTERED DRUGS (ALT 637 FOR MEDICARE OP): Mod: SE | Performed by: CASE MANAGER/CARE COORDINATOR

## 2025-02-06 PROCEDURE — 99395 PREV VISIT EST AGE 18-39: CPT | Performed by: PEDIATRICS

## 2025-02-06 RX ORDER — AZITHROMYCIN 500 MG/1
1000 TABLET, FILM COATED ORAL ONCE
Status: COMPLETED | OUTPATIENT
Start: 2025-02-06 | End: 2025-02-06

## 2025-02-06 RX ADMIN — AZITHROMYCIN DIHYDRATE 1000 MG: 500 TABLET ORAL at 15:24

## 2025-02-06 SDOH — ECONOMIC STABILITY - FOOD INSECURITY: FOOD INSECURITY: Z59.41

## 2025-02-06 ASSESSMENT — PAIN SCALES - GENERAL: PAINLEVEL_OUTOF10: 0-NO PAIN

## 2025-02-06 NOTE — PATIENT INSTRUCTIONS
It was a pleasure seeing you in clinic today! You are overall growing and developing normally, keep up all of the good work.     Please go to the lab to get your blood work drawn to test for anemia. We will also give you medication for your chlamydia infection since you threw up a few doses. Please come back about 6 weeks after you first got treated to get retested.    Important Phone Numbers:   - Poison Control: [phone].  - National Suicide Prevention Hotline: 443.425.2233  - 24/7 Nurse Line: [phone]     We have a nurse advice line 24/7- just call us at [phone]. We also have daily sick visits (same day sick visit) and walk in clinic M-F. Use the same phone number for all. Please let us help you avoid using the Emergency Room if there is not an emergency!
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
## 2025-02-07 ENCOUNTER — TELEPHONE (OUTPATIENT)
Dept: PEDIATRICS | Facility: HOSPITAL | Age: 20
End: 2025-02-07
Payer: COMMERCIAL

## 2025-02-07 LAB
BASOPHILS # BLD AUTO: 21 CELLS/UL (ref 0–200)
BASOPHILS NFR BLD AUTO: 0.5 %
EOSINOPHIL # BLD AUTO: 42 CELLS/UL (ref 15–500)
EOSINOPHIL NFR BLD AUTO: 1 %
ERYTHROCYTE [DISTWIDTH] IN BLOOD BY AUTOMATED COUNT: 13.4 % (ref 11–15)
FERRITIN SERPL-MCNC: 57 NG/ML (ref 16–154)
HBV SURFACE AG SERPL QL IA: NORMAL
HCT VFR BLD AUTO: 43.3 % (ref 35–45)
HCV AB SERPL QL IA: NORMAL
HGB BLD-MCNC: 14.2 G/DL (ref 11.7–15.5)
IRON SATN MFR SERPL: 27 % (CALC) (ref 16–45)
IRON SERPL-MCNC: 92 MCG/DL (ref 40–190)
LYMPHOCYTES # BLD AUTO: 1659 CELLS/UL (ref 850–3900)
LYMPHOCYTES NFR BLD AUTO: 39.5 %
MCH RBC QN AUTO: 29.2 PG (ref 27–33)
MCHC RBC AUTO-ENTMCNC: 32.8 G/DL (ref 32–36)
MCV RBC AUTO: 89.1 FL (ref 80–100)
MONOCYTES # BLD AUTO: 273 CELLS/UL (ref 200–950)
MONOCYTES NFR BLD AUTO: 6.5 %
NEUTROPHILS # BLD AUTO: 2205 CELLS/UL (ref 1500–7800)
NEUTROPHILS NFR BLD AUTO: 52.5 %
PLATELET # BLD AUTO: 319 THOUSAND/UL (ref 140–400)
PMV BLD REES-ECKER: 11.3 FL (ref 7.5–12.5)
RBC # BLD AUTO: 4.86 MILLION/UL (ref 3.8–5.1)
T PALLIDUM AB SER QL IA: NORMAL
TIBC SERPL-MCNC: 338 MCG/DL (CALC) (ref 250–450)
WBC # BLD AUTO: 4.2 THOUSAND/UL (ref 3.8–10.8)

## 2025-02-07 NOTE — TELEPHONE ENCOUNTER
No phone number for patient in chart. Attempted phone call to both numbers in chart, no answer for either.

## 2025-02-10 LAB
HBV SURFACE AG SERPL QL IA: NORMAL
HCV AB SERPL QL IA: NORMAL
T PALLIDUM AB SER QL IA: NEGATIVE

## 2025-02-12 ENCOUNTER — OFFICE VISIT (OUTPATIENT)
Dept: OBSTETRICS AND GYNECOLOGY | Facility: CLINIC | Age: 20
End: 2025-02-12
Payer: COMMERCIAL

## 2025-02-12 VITALS
WEIGHT: 138.7 LBS | HEART RATE: 66 BPM | DIASTOLIC BLOOD PRESSURE: 71 MMHG | SYSTOLIC BLOOD PRESSURE: 127 MMHG | BODY MASS INDEX: 23.25 KG/M2

## 2025-02-12 DIAGNOSIS — L02.92 RECURRENT BOILS: ICD-10-CM

## 2025-02-12 DIAGNOSIS — Z01.411 ENCOUNTER FOR GYNECOLOGICAL EXAMINATION WITH ABNORMAL FINDING: Primary | ICD-10-CM

## 2025-02-12 DIAGNOSIS — A74.9 CHLAMYDIA INFECTION: ICD-10-CM

## 2025-02-12 DIAGNOSIS — Z30.42 DEPO-PROVERA CONTRACEPTIVE STATUS: ICD-10-CM

## 2025-02-12 PROCEDURE — 1036F TOBACCO NON-USER: CPT | Performed by: ADVANCED PRACTICE MIDWIFE

## 2025-02-12 PROCEDURE — 99395 PREV VISIT EST AGE 18-39: CPT | Performed by: ADVANCED PRACTICE MIDWIFE

## 2025-02-12 ASSESSMENT — PAIN SCALES - GENERAL: PAINLEVEL_OUTOF10: 0-NO PAIN

## 2025-02-12 NOTE — PROGRESS NOTES
Subjective   Levi Newberry is a 19 y.o. female who is here for Annual Exam (Pt does not have menstrual cycle d/t depo./Pt last depo was 2025/Pt reports boil outside vaginal would like it looked at. /Pt declines STI screening. ).     Concerns today:  has a boil on right labia, drains spontaneously maybe every 1-2 weeks  Treated for chlamydia , missed 2 doses of doxy and was given a dose of azithro on  by her pediatrician which she vomited     Amenorrheic on depo    Sexual Activity: sexually active, male partners  Pain with intercourse? No   Loss of desire? No   Able to have an orgasm? Yes     History of prior STI: gonorrhea and chlamydia  Desires STI screening? No    Current contraception: Depo-Provera injections    Last pap: n/a  Family history of uterine or ovarian cancer: no  History of abnormal mammogram: no  Family history of breast cancer: no  OB History    Para Term  AB Living   1 1 1     1   SAB IAB Ectopic Multiple Live Births         0 1      # Outcome Date GA Lbr Jim/2nd Weight Sex Type Anes PTL Lv   1 Term 24 37w5d 11:10  05:27 2.83 kg F CS-LTranv EPI  GABY      Complications: Failure to Progress in Second Stage      Objective   /71   Pulse 66   Wt 62.9 kg (138 lb 11.2 oz)   LMP  (LMP Unknown)   Breastfeeding No   BMI 23.25 kg/m²   Physical Exam  Constitutional:       General: She is not in acute distress.     Appearance: Normal appearance. She is well-developed.   Genitourinary:      Urethral meatus normal.      No lesions in the vagina.      Right Labia: lesions.      Right Labia: No rash or skin changes.     Left Labia: No lesions, skin changes or rash.           No vaginal discharge, erythema or bleeding.      No vaginal prolapse present.     No vaginal atrophy present.       Right Adnexa: not tender and no mass present.     Left Adnexa: not tender and no mass present.     No cervical motion tenderness, discharge, friability, lesion or polyp.       Uterus is not fixed, tender or irregular.      No uterine mass detected.     Uterus is anteverted.      Pelvic exam was performed with patient in the lithotomy position.   Breasts:     Right: Normal.      Left: Normal.   Neck:      Thyroid: No thyroid mass, thyromegaly or thyroid tenderness.   Cardiovascular:      Heart sounds: Normal heart sounds.   Pulmonary:      Effort: Pulmonary effort is normal.      Breath sounds: Normal breath sounds.   Abdominal:      Palpations: Abdomen is soft. There is no mass.      Tenderness: There is no abdominal tenderness.   Lymphadenopathy:      Cervical: No cervical adenopathy.   Neurological:      General: No focal deficit present.   Skin:     General: Skin is warm and dry.   Psychiatric:         Mood and Affect: Mood and affect normal.         Behavior: Behavior is cooperative.   Vitals reviewed.     Assessment/Plan   Diagnoses and all orders for this visit:  Encounter for gynecological examination with abnormal finding  -     routine AE  -     healthy lifestyle encouraged  -     pap ollected  -     condoms as needed for STI prevention  -     Gardasil UTD  -     Recommend PCP visit for routine health maintenance  -     RTO 1 year for AE or sooner PRN  Recurrent boils        -     derm referral provided  Chlamydia infection        -     treated x2 for missed doses        -     ENOC later this month, order in by PCP        -     remainder of STI screening negative  Depo-Provera contraceptive status       -      last dose given 1/21, due for depo 4/8-4/22    Follow up in about 8 weeks (around 4/9/2025) for RN visit for Depo.    Aysha Faith, APRN-CNM, APRN-CNP

## 2025-02-17 LAB
CYTOLOGY CMNT CVX/VAG CYTO-IMP: NORMAL
LAB AP HPV HR: NORMAL
LABORATORY COMMENT REPORT: NORMAL
PATH REPORT.TOTAL CANCER: NORMAL

## 2025-02-20 DIAGNOSIS — A64 STI (SEXUALLY TRANSMITTED INFECTION): ICD-10-CM

## 2025-03-11 ENCOUNTER — APPOINTMENT (OUTPATIENT)
Dept: OBSTETRICS AND GYNECOLOGY | Facility: CLINIC | Age: 20
End: 2025-03-11
Payer: COMMERCIAL

## 2025-03-11 ENCOUNTER — OFFICE VISIT (OUTPATIENT)
Dept: OBSTETRICS AND GYNECOLOGY | Facility: CLINIC | Age: 20
End: 2025-03-11
Payer: COMMERCIAL

## 2025-03-11 VITALS
DIASTOLIC BLOOD PRESSURE: 77 MMHG | SYSTOLIC BLOOD PRESSURE: 109 MMHG | WEIGHT: 138.7 LBS | BODY MASS INDEX: 23.25 KG/M2 | HEART RATE: 103 BPM

## 2025-03-11 DIAGNOSIS — F33.9 EPISODE OF RECURRENT MAJOR DEPRESSIVE DISORDER, UNSPECIFIED DEPRESSION EPISODE SEVERITY (CMS-HCC): Primary | Chronic | ICD-10-CM

## 2025-03-11 PROCEDURE — 99213 OFFICE O/P EST LOW 20 MIN: CPT

## 2025-03-11 PROCEDURE — 99213 OFFICE O/P EST LOW 20 MIN: CPT | Mod: GC

## 2025-03-11 NOTE — ASSESSMENT & PLAN NOTE
- No acute safety concerns at this time  - recommended follow up with OB behavioral health and psychiatry given h/o multiple mood medications. Offered to start prior medication but patient is amenable to waiting for psych eval/recommendation  - mental health resources list given and discussed use of crisis hotline for acute concerns  - referrals placed

## 2025-03-11 NOTE — LETTER
March 11, 2025     Patient: Levi Newberry   YOB: 2005   Date of Visit: 3/11/2025       To Whom It May Concern:    Levi Newberry was seen in my clinic on 3/11/2025 at 3:15 pm. Please excuse Levi for her absence from work on this day to make the appointment. She is currently in process of being evaluated by psychiatry, who will complete any necessary paperwork for medical leave.     If you have any questions or concerns, please don't hesitate to call.         Sincerely,         Claudia Oliveros MD        CC: No Recipients

## 2025-03-11 NOTE — PROGRESS NOTES
I saw and evaluated the patient. I personally obtained the key and critical portions of the history and physical exam or was physically present for key and critical portions performed by the resident/fellow. I reviewed the resident/fellow's documentation and discussed the patient with the resident/fellow. I agree with the resident/fellow's medical decision making as documented in the note.    Pam Perez MD

## 2025-03-11 NOTE — PROGRESS NOTES
Gynecology Problem Visit    Chief complaint:   Chief Complaint   Patient presents with    Depression     Pt in office stated she here for on going depression. Since delivery of baby        Levi Newberry is a 19 y.o.  here for depression    HPI:     Patient last delivered in 2024. Feels like she has been struggling with depression since then that has been getting worse.  Lack of interest in activities, sleeping most of the day, low appetite.   Denies episodes of high energy, staying awake, risky behavior.  Denies thoughts of self harm or harm to others. No guns in the home    Has history of CLARIBEL & suicide attemtp in  via overdose.  Used to be followed by [facility], not currently seeing anyone and desires to establish care.   Previously on lexapro and wellbutrin, wellbutrin probably better but uncertain.     GynHx:  Currently on depo for contraception  Last pap 2025    Past med hx and past surg hx reviewed and notable for: mood disorders as above    Objective   /77   Pulse 103   Wt 62.9 kg (138 lb 11.2 oz)   LMP  (LMP Unknown)   BMI 23.25 kg/m²     General: Well appearing, alert  HEENT: normocephalic, EOMI, clear sclera  Cardio: Warm and well perfused  Resp: breathing comfortably on room air  Abd:  nondistended  Neuro: grossly intact, no focal deficits  Extremities: full ROM, no calf tenderness  Psych: A&O x3, appropriate mood and affect      Assessment and Plan:  Levi Newberry is a 19 y.o.  who presents for depression.  Problem List Items Addressed This Visit       Major depressive disorder - Primary (Chronic)    Overview     - Rx'd lexapro, not taking  - Follows with therapist   - Hx suicide attempts via overdose, last in 2022         Current Assessment & Plan     - No acute safety concerns at this time  - recommended follow up with OB behavioral health and psychiatry given h/o multiple mood medications. Offered to start prior medication but patient  is amenable to waiting for psych eval/recommendation  - mental health resources list given and discussed use of crisis hotline for acute concerns  - referrals placed         Relevant Orders    Referral to Ob-Gyn Behavioral Health    Referral to Psychiatry        RTC for annual visit     Patient seen and discussed with Dr. Ana Oliveros MD  PGY-3, Obstetrics and Gynecology

## 2025-04-08 ENCOUNTER — APPOINTMENT (OUTPATIENT)
Dept: OBSTETRICS AND GYNECOLOGY | Facility: CLINIC | Age: 20
End: 2025-04-08
Payer: COMMERCIAL

## 2025-04-18 DIAGNOSIS — Z30.42 ENCOUNTER FOR DEPO-PROVERA CONTRACEPTION: ICD-10-CM

## 2025-04-18 RX ORDER — MEDROXYPROGESTERONE ACETATE 150 MG/ML
150 INJECTION, SUSPENSION INTRAMUSCULAR
Status: SHIPPED | OUTPATIENT
Start: 2025-04-18 | End: 2026-03-20

## 2025-04-24 ENCOUNTER — APPOINTMENT (OUTPATIENT)
Dept: BEHAVIORAL HEALTH | Facility: CLINIC | Age: 20
End: 2025-04-24
Payer: COMMERCIAL

## 2025-05-06 ENCOUNTER — OFFICE VISIT (OUTPATIENT)
Dept: OBSTETRICS AND GYNECOLOGY | Facility: CLINIC | Age: 20
End: 2025-05-06
Payer: COMMERCIAL

## 2025-05-06 VITALS — HEIGHT: 64 IN | WEIGHT: 140.8 LBS | BODY MASS INDEX: 24.04 KG/M2

## 2025-05-06 DIAGNOSIS — Z30.09 GENERAL COUNSELING AND ADVICE FOR CONTRACEPTIVE MANAGEMENT: ICD-10-CM

## 2025-05-06 DIAGNOSIS — Z30.42 ENCOUNTER FOR MANAGEMENT AND INJECTION OF DEPO-PROVERA: ICD-10-CM

## 2025-05-06 DIAGNOSIS — N94.6 DYSMENORRHEA: ICD-10-CM

## 2025-05-06 DIAGNOSIS — Z11.3 ROUTINE SCREENING FOR STI (SEXUALLY TRANSMITTED INFECTION): Primary | ICD-10-CM

## 2025-05-06 PROCEDURE — 1036F TOBACCO NON-USER: CPT | Performed by: NURSE PRACTITIONER

## 2025-05-06 PROCEDURE — 3008F BODY MASS INDEX DOCD: CPT | Performed by: NURSE PRACTITIONER

## 2025-05-06 PROCEDURE — 96372 THER/PROPH/DIAG INJ SC/IM: CPT | Performed by: NURSE PRACTITIONER

## 2025-05-06 PROCEDURE — 99213 OFFICE O/P EST LOW 20 MIN: CPT | Performed by: NURSE PRACTITIONER

## 2025-05-06 RX ORDER — MEDROXYPROGESTERONE ACETATE 150 MG/ML
150 INJECTION, SUSPENSION INTRAMUSCULAR ONCE
Status: COMPLETED | OUTPATIENT
Start: 2025-05-06 | End: 2025-05-06

## 2025-05-06 RX ORDER — MEDROXYPROGESTERONE ACETATE 150 MG/ML
150 INJECTION, SUSPENSION INTRAMUSCULAR ONCE
Status: DISCONTINUED | OUTPATIENT
Start: 2025-05-06 | End: 2025-05-06

## 2025-05-06 RX ADMIN — MEDROXYPROGESTERONE ACETATE 150 MG: 150 INJECTION, SUSPENSION INTRAMUSCULAR at 13:49

## 2025-05-06 ASSESSMENT — ENCOUNTER SYMPTOMS
EYES NEGATIVE: 0
GASTROINTESTINAL NEGATIVE: 0
NEUROLOGICAL NEGATIVE: 0
PSYCHIATRIC NEGATIVE: 0
CONSTITUTIONAL NEGATIVE: 0
RESPIRATORY NEGATIVE: 0
CARDIOVASCULAR NEGATIVE: 0
MUSCULOSKELETAL NEGATIVE: 0
ENDOCRINE NEGATIVE: 0
HEMATOLOGIC/LYMPHATIC NEGATIVE: 0
ALLERGIC/IMMUNOLOGIC NEGATIVE: 0

## 2025-05-06 ASSESSMENT — PAIN SCALES - GENERAL: PAINLEVEL_OUTOF10: 0-NO PAIN

## 2025-05-06 NOTE — PROGRESS NOTES
Last Depo:5/6/2025  Next Due:7/21/2025-8/4/2025  MARIAM:unknown  UPT:neg  LMP: last week  Complaints:none  Site Given:R arm  NDC:93011-971-88  LOT:[id number]  EXP: 9/2027

## 2025-05-06 NOTE — PROGRESS NOTES
Subjective   Patient ID: Levi Newberry is a 19 y.o. female who presents for depo and STI Screening.  HPI  Was due for her last injection on 4/22  LMP started today  -upt today    H/o dysmenorrhea, treating with 800mg po ibuprofen which is ineffective  Depo provera has helped decrease her bleeding not the cramping  Previously was on OCP but felt that her symptoms were worse  Would prefer to stay on depo provera  No dyspareunia    Would like STI screening as a routine, asymptomatic  Review of Systems    Objective   Physical Exam    Assessment/Plan   Diagnoses and all orders for this visit:  Routine screening for STI (sexually transmitted infection)  -     C. trachomatis / N. gonorrhoeae, Amplified, Urogenital; Future  -     Trichomonas vaginalis, Amplified  -     Hepatitis B Surface Antigen; Future  -     Hepatitis C Antibody; Future  -     HIV 1/2 Antigen/Antibody Screen with Reflex to Confirmation; Future  -     Syphilis Screen with Reflex; Future  General counseling and advice for contraceptive management  -     medroxyPROGESTERone (Depo-Provera) injection 150 mg  Dysmenorrhea       Information on [facility] given for dysmenorrhea  I asked her to contact me after the next time she has dysmenorrhea for an update  We can consider PFPT if she prefers to stay on depo provera    GRACE Vicente-CNP 05/06/25 1:31 PM

## 2025-05-07 LAB — T VAGINALIS RRNA SPEC QL NAA+PROBE: NOT DETECTED

## 2025-05-10 LAB
HBV SURFACE AG SERPL QL IA: NORMAL
HCV AB SERPL QL IA: NORMAL
HIV 1+2 AB+HIV1 P24 AG SERPL QL IA: NORMAL
T PALLIDUM AB SER QL IA: NEGATIVE

## 2025-05-14 LAB
C TRACH RRNA SPEC QL NAA+PROBE: NOT DETECTED
N GONORRHOEA RRNA SPEC QL NAA+PROBE: NOT DETECTED
QUEST GC CT AMPLIFIED (ALWAYS MESSAGE): NORMAL
T VAGINALIS RRNA SPEC QL NAA+PROBE: NOT DETECTED

## 2025-07-24 ENCOUNTER — APPOINTMENT (OUTPATIENT)
Dept: DERMATOLOGY | Facility: CLINIC | Age: 20
End: 2025-07-24
Payer: COMMERCIAL

## (undated) DEVICE — GLOVE, SURGICAL, PROTEXIS PI MICRO, 6.5, PF, LF

## (undated) DEVICE — SUTURE, PDS II, 0, 60 IN, CTX, VIOLET

## (undated) DEVICE — DRAPE PACK, CESAREAN SECTION, CUSTOM, UHC

## (undated) DEVICE — TOWEL PACK, STERILE, 4/PACK, BLUE

## (undated) DEVICE — SUTURE, CHROMIC, 0, 54 IN, TIE, BROWN

## (undated) DEVICE — GLOVE, SURGICAL, PROTEXIS PI BLUE W/NEUTHERA, 6.5, PF, LF

## (undated) DEVICE — SUTURE, VICRYL, 0, 36 IN, CT, UNDYED

## (undated) DEVICE — SUTURE, MONOCRYL, 2-0, 27 IN, SH/V-20 , UNDYED

## (undated) DEVICE — SUTURE, VICRYL, 4-0, 27 IN, PS-1, UNDYED